# Patient Record
Sex: MALE | Race: BLACK OR AFRICAN AMERICAN | URBAN - METROPOLITAN AREA
[De-identification: names, ages, dates, MRNs, and addresses within clinical notes are randomized per-mention and may not be internally consistent; named-entity substitution may affect disease eponyms.]

---

## 2017-06-09 ENCOUNTER — CARE COORDINATION (OUTPATIENT)
Dept: FAMILY MEDICINE | Facility: CLINIC | Age: 32
End: 2017-06-09

## 2017-06-09 ENCOUNTER — OFFICE VISIT (OUTPATIENT)
Dept: FAMILY MEDICINE | Facility: CLINIC | Age: 32
End: 2017-06-09
Payer: COMMERCIAL

## 2017-06-09 VITALS
SYSTOLIC BLOOD PRESSURE: 146 MMHG | OXYGEN SATURATION: 95 % | WEIGHT: 248.7 LBS | HEIGHT: 70 IN | HEART RATE: 74 BPM | BODY MASS INDEX: 35.6 KG/M2 | DIASTOLIC BLOOD PRESSURE: 110 MMHG | TEMPERATURE: 98.2 F | RESPIRATION RATE: 14 BRPM

## 2017-06-09 DIAGNOSIS — R03.0 ELEVATED BLOOD PRESSURE READING WITHOUT DIAGNOSIS OF HYPERTENSION: ICD-10-CM

## 2017-06-09 DIAGNOSIS — Z00.01 ENCOUNTER FOR ROUTINE ADULT MEDICAL EXAM WITH ABNORMAL FINDINGS: Primary | ICD-10-CM

## 2017-06-09 DIAGNOSIS — D23.4 BENIGN NEOPLASM OF SCALP: ICD-10-CM

## 2017-06-09 PROCEDURE — 99385 PREV VISIT NEW AGE 18-39: CPT | Performed by: NURSE PRACTITIONER

## 2017-06-09 PROCEDURE — 99212 OFFICE O/P EST SF 10 MIN: CPT | Mod: 25 | Performed by: NURSE PRACTITIONER

## 2017-06-09 NOTE — MR AVS SNAPSHOT
After Visit Summary   6/9/2017    Patrice Nicole    MRN: [id number]           Patient Information     Date Of Birth          1985        Visit Information        Provider Department      6/9/2017 2:00 PM Carla Mullins NP [facility]        Today's Diagnoses     Need for prophylactic vaccination with tetanus-diphtheria (TD)    -  1    Benign neoplasm of scalp          Care Instructions      Preventive Health Recommendations  Male Ages 26 - 39    Yearly exam:             See your health care provider every year in order to  o   Review health changes.   o   Discuss preventive care.    o   Review your medicines if your doctor has prescribed any.    You should be tested each year for STDs (sexually transmitted diseases), if you re at risk.     After age 35, talk to your provider about cholesterol testing. If you are at risk for heart disease, have your cholesterol tested at least every 5 years.     If you are at risk for diabetes, you should have a diabetes test (fasting glucose).  Shots: Get a flu shot each year. Get a tetanus shot every 10 years.     Nutrition:    Eat at least 5 servings of fruits and vegetables daily.     Eat whole-grain bread, whole-wheat pasta and brown rice instead of white grains and rice.     Talk to your provider about Calcium and Vitamin D.     Lifestyle    Exercise for at least 150 minutes a week (30 minutes a day, 5 days a week). This will help you control your weight and prevent disease.     Limit alcohol to one drink per day.     No smoking.     Wear sunscreen to prevent skin cancer.     See your dentist every six months for an exam and cleaning.             Follow-ups after your visit        Additional Services     DERMATOLOGY REFERRAL       Your provider has referred you to: FMG: [facility] [phone]   [email or web address]  FMG: [facility] [phone]    "[email or web address]    Please be aware that coverage of these services is subject to the terms and limitations of your health insurance plan.  Call member services at your health plan with any benefit or coverage questions.      Please bring the following with you to your appointment:    (1) Any X-Rays, CTs or MRIs which have been performed.  Contact the facility where they were done to arrange for  prior to your scheduled appointment.    (2) List of current medications  (3) This referral request   (4) Any documents/labs given to you for this referral                  Who to contact     If you have questions or need follow up information about today's clinic visit or your schedule please contact [facility] directly at [phone].  Normal or non-critical lab and imaging results will be communicated to you by MyChart, letter or phone within 4 business days after the clinic has received the results. If you do not hear from us within 7 days, please contact the clinic through MyChart or phone. If you have a critical or abnormal lab result, we will notify you by phone as soon as possible.  Submit refill requests through Wikidata or call your pharmacy and they will forward the refill request to us. Please allow 3 business days for your refill to be completed.          Additional Information About Your Visit        Wikidata Information     Wikidata lets you send messages to your doctor, view your test results, renew your prescriptions, schedule appointments and more. To sign up, go to [email or web address] . Click on \"Log in\" on the left side of the screen, which will take you to the Welcome page. Then click on \"Sign up Now\" on the right side of the page.     You will be asked to enter the access code listed below, as well as some personal information. Please follow the directions to create your username and password.     Your access code is: [email or web address]  Expires: " "2017  2:42 PM     Your access code will  in 90 days. If you need help or a new code, please call your [facility] or [phone].        Care EveryWhere ID     This is your Care EveryWhere ID. This could be used by other organizations to access your [place] medical records  [id number]        Your Vitals Were     Pulse Temperature Respirations Height Pulse Oximetry BMI (Body Mass Index)    74 98.2  F (36.8  C) (Oral) 14 1.765 m (5' 9.5\") 95% 36.2 kg/m2       Blood Pressure from Last 3 Encounters:   17 (!) 146/110    Weight from Last 3 Encounters:   17 112.8 kg (248 lb 11.2 oz)              We Performed the Following     DERMATOLOGY REFERRAL        Primary Care Provider    None Specified       No primary provider on file.        Thank you!     Thank you for choosing [facility]  for your care. Our goal is always to provide you with excellent care. Hearing back from our patients is one way we can continue to improve our services. Please take a few minutes to complete the written survey that you may receive in the mail after your visit with us. Thank you!             Your Updated Medication List - Protect others around you: Learn how to safely use, store and throw away your medicines at www.disposemymeds.org.      Notice  As of 2017  2:42 PM    You have not been prescribed any medications.      "

## 2017-06-09 NOTE — PROGRESS NOTES
Please call patient to return to clinic in 2-4 weeks to have BP rechecked with nurse because it was high in clinic. Also encourage low sodium diet and exercise to maintain health.   JORDON Mendiola, NP-C

## 2017-06-09 NOTE — PROGRESS NOTES
Patient notified of the message below.  Appointment has been made with an ancillary nurse.  Malika Walsh RN

## 2017-06-09 NOTE — PROGRESS NOTES
SUBJECTIVE:     CC: Patrice Nicole is an 31 year old male who presents for preventative health visit.     Was in [place] before.     Healthy Habits:    Do you get at least three servings of calcium containing foods daily (dairy, green leafy vegetables, etc.)? yes    Amount of exercise or daily activities, outside of work: 3 day(s) per week    Problems taking medications regularly No    Medication side effects: No    Have you had an eye exam in the past two years? no    Do you see a dentist twice per year? no    Do you have sleep apnea, excessive snoring or daytime drowsiness?no        Concern - bumps on the back of head     Onset: 8 months ago    Description:   Has a bump on the back head and a couple more have come about    Intensity: mild, moderate    Progression of Symptoms:  worsening    Accompanying Signs & Symptoms:  Some drainage-bleeding small amount-no pain.        Previous history of similar problem: no    Precipitating factors:   Worsened by: unsure    Alleviating factors:  Improved by: nothing         Therapies Tried and outcome: nothing-was trying to keep it dry and clean.  Gets buzz cut for his hair q 2-3 weeks. Did notice some lumps forming after shaving hair.     Following a chiropractor for left back aching/tighness. 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     Dad had recent high PSA.   No family history of cancer      Today's PHQ-2 Score:   PHQ-2 ( 1999 Pfizer) 6/9/2017   Q1: Little interest or pleasure in doing things 0   Q2: Feeling down, depressed or hopeless 0   PHQ-2 Score 0       Abuse: Current or Past(Physical, Sexual or Emotional)- No  Do you feel safe in your environment - Yes    Social History   Substance Use Topics     Smoking status: Never Smoker     Smokeless tobacco: Not on file     Alcohol use Yes     The patient does not drink >3 drinks per day nor >7 drinks per week.    Last PSA: No results found for: PSA    No results for input(s): CHOL, HDL, LDL, TRIG,  TRICIA CARMONA in the last 60939 hours.    Reviewed orders with patient. Reviewed health maintenance and updated orders accordingly - Yes    Reviewed and updated as needed this visit by clinical staff  Tobacco  Allergies  Meds  Med Hx  Surg Hx  Fam Hx  Soc Hx        Reviewed and updated as needed this visit by Provider  Tobacco  Allergies  Meds  Soc Hx       History reviewed. No pertinent past medical history.   Past Surgical History:   Procedure Laterality Date     ARTHROSCOPIC RECONSTRUCTION ANTERIOR CRUCIATE LIGAMENT  2011 forgein 2014    glass removed from right lower leg       ROS:  C: NEGATIVE for fever, chills, change in weight  I: NEGATIVE for worrisome rashes, moles or lesions  E: NEGATIVE for vision changes or irritation  ENT: NEGATIVE for ear, mouth and throat problems  R: NEGATIVE for significant cough or SOB  CV: NEGATIVE for chest pain, palpitations or peripheral edema  GI: NEGATIVE for nausea, abdominal pain, heartburn, or change in bowel habits   male: negative for dysuria, hematuria, decreased urinary stream, erectile dysfunction, urethral discharge  M: NEGATIVE for significant arthralgias or myalgia  N: NEGATIVE for weakness, dizziness or paresthesias  P: NEGATIVE for changes in mood or affect    BP Readings from Last 3 Encounters:   06/09/17 (!) 146/110    Wt Readings from Last 3 Encounters:   06/09/17 112.8 kg (248 lb 11.2 oz)              There is no problem list on file for this patient.    Past Surgical History:   Procedure Laterality Date     ARTHROSCOPIC RECONSTRUCTION ANTERIOR CRUCIATE LIGAMENT  2011 forgein 2014    glass removed from right lower leg       Social History   Substance Use Topics     Smoking status: Never Smoker     Smokeless tobacco: Not on file     Alcohol use Yes     Family History   Problem Relation Age of Onset     DIABETES Maternal Grandmother          No current outpatient prescriptions on file.     Allergies   Allergen Reactions     Penicillins  "Rash     Other reaction(s): Confusion     OBJECTIVE:     BP (!) 146/110 (BP Location: Right arm, Patient Position: Chair, Cuff Size: Adult Large)  Pulse 74  Temp 98.2  F (36.8  C) (Oral)  Resp 14  Ht 1.765 m (5' 9.5\")  Wt 112.8 kg (248 lb 11.2 oz)  SpO2 95%  BMI 36.2 kg/m2  EXAM:  GENERAL: healthy, alert and no distress  EYES: Eyes grossly normal to inspection, PERRL and conjunctivae and sclerae normal  HENT: ear canals and TM's normal, nose and mouth without ulcers or lesions  NECK: no adenopathy, no asymmetry, masses, or scars and thyroid normal to palpation  RESP: lungs clear to auscultation - no rales, rhonchi or wheezes  CV: regular rate and rhythm, normal S1 S2, no S3 or S4, no murmur, click or rub, no peripheral edema  ABDOMEN: soft, nontender, no hepatosplenomegaly, no masses and bowel sounds normal  MS: no gross musculoskeletal defects noted, no edema  NEURO: Normal strength and tone, mentation intact and speech normal  PSYCH: mentation appears normal, affect normal/bright  SKIN: posterior hairline with small, non-tender keloid appearing lump with few other small lumps scattered into hairline.     ASSESSMENT/PLAN:     1. Encounter for routine adult medical exam with abnormal findings  Patient doing well.     2. Benign neoplasm of scalp  Send to derm. May need steroid injection to improve lumps but will let dermatology make that call.   - DERMATOLOGY REFERRAL    3. Elevated blood pressure reading without diagnosis of hypertension  Patient to return in 2-4 weeks for nurse BP check. Encourage low salt diet and exercise.       COUNSELING:  Reviewed preventive health counseling, as reflected in patient instructions    BP Screening:   Last 3 BP Readings:    BP Readings from Last 3 Encounters:   06/09/17 (!) 146/110     Body mass index is 36.2 kg/(m^2).    The following was recommended to the patient:  Re-screen within 4 weeks and recommend lifestyle modifications     reports that he has never smoked. He " "does not have any smokeless tobacco history on file.    Estimated body mass index is 36.2 kg/(m^2) as calculated from the following:    Height as of this encounter: 1.765 m (5' 9.5\").    Weight as of this encounter: 112.8 kg (248 lb 11.2 oz).   Weight management plan: Discussed healthy diet and exercise guidelines and patient will follow up in as needed in clinic to re-evaluate.    Counseling Resources:  ATP IV Guidelines  Pooled Cohorts Equation Calculator  FRAX Risk Assessment  ICSI Preventive Guidelines  Dietary Guidelines for Americans, 2010  USDA's MyPlate  ASA Prophylaxis  Lung CA Screening    JORDON Mendiola, NP-C  [facility]  "

## 2017-06-09 NOTE — NURSING NOTE
"Chief Complaint   Patient presents with     Physical       Initial BP (!) 160/100 (BP Location: Right arm, Patient Position: Chair, Cuff Size: Adult Large)  Pulse 74  Temp 98.2  F (36.8  C) (Oral)  Resp 14  Ht 1.765 m (5' 9.5\")  Wt 112.8 kg (248 lb 11.2 oz)  SpO2 95%  BMI 36.2 kg/m2 Estimated body mass index is 36.2 kg/(m^2) as calculated from the following:    Height as of this encounter: 1.765 m (5' 9.5\").    Weight as of this encounter: 112.8 kg (248 lb 11.2 oz).  Medication Reconciliation: complete       Lucrecia Young MA    "

## 2017-08-07 ENCOUNTER — OFFICE VISIT (OUTPATIENT)
Dept: FAMILY MEDICINE | Facility: CLINIC | Age: 32
End: 2017-08-07
Payer: COMMERCIAL

## 2017-08-07 DIAGNOSIS — L91.0 KELOID SCAR: Primary | ICD-10-CM

## 2017-08-07 PROCEDURE — 99212 OFFICE O/P EST SF 10 MIN: CPT | Mod: 25 | Performed by: FAMILY MEDICINE

## 2017-08-07 PROCEDURE — 11900 INJECT SKIN LESIONS </W 7: CPT | Performed by: FAMILY MEDICINE

## 2017-08-07 NOTE — PROGRESS NOTES
"[facility] - PRIMARY CARE SKIN    CC : Lesion(s)  SUBJECTIVE:                                                    Patrice Nicole is a 31 year old male who presents to clinic today because of a lesion on the back of the neck.    Bothersome lesions noticed by the patient or other skin concerns :  Issue One : Lesion on back of head. Bleeding occurs most frequently after washing his hair and the area \"softening\". He denies a history of rashes or acne in the affected area.  Onset : unknown.  Enlarging : YES.  Bleeding : YES  Itchy or irritating : NO.  Pain or tenderness : YES.  Changing color : NO.    Personal history of skin cancer : NO.  Family history of skin cancer : NO.    Sun Exposure History  Previous history of significant sun exposure:  Blistering sunburns : NO  Tanning beds : NO.  Sunscreen Use : NO.  Sun-protective clothing use : No  Wide-brimmed hats : Yes  Sunglasses : Yes  Seeks shade : No    Occupation : pest control (both indoor & outdoor).    Refer to electronic medical record (EMR) for past medical history and medications.    INTEGUMENTARY/SKIN: POSITIVE for non-healing lesion  ROS : 14 point review of systems was negative except the symptoms listed above in the HPI.    This document serves as a record of the services and decisions personally performed and made by Alivia Cuellar MD. It was created on her behalf by Phong Bonilla, a trained medical scribe.  The creation of this document is based on the scribe's personal observations and the provider's statements to the medical scribe.  Phong Bonilla, August 7, 2017 12:09 PM      OBJECTIVE:                                                    GENERAL: healthy, alert and no distress  SKIN: Baxter Skin Type - VI.  Neck were examined. The dermatoscope was used to help evaluate pigmented lesions.  Skin Pertinent Findings:  Mid-occipital scalp : 15 mm x 7 mm in size, raised, firm lesion consistent with keloid.  Name : Triamcinolone acetonide (Kenalog) " injection.  Indication : keloids.  Completed by : Alivia Cuellar MD  Note : Prior to the procedure, we discussed possible hypo- and hyperpigmentation or depression of the skin post-procedure. Explained that more then one injection, up to six injections, spaced 4-6 weeks apart may be necessary.    Skin was cleansed with alcohol. 0.3 mL triamcinolone acetonide 10 mg/mL drawn up and injected intralesionally into the lesion.  Total injected :   Smaller mid-occipital scalp keloid 0.02 mL = 0.2 mg  Larger mid-occipital scalp keloid : 0.18 mL = 1.8 mg  Lot # : [id number]. Expiration Date : 8/2018.    Blanching was present during the injection. Minimal bleeding occurred. Patient left in satisfactory condition.  Total number treated : 2.  Treatment number : 1.  No erythema, fluctuance  Diagnostic Test Results:  none           ASSESSMENT:                                                      Encounter Diagnosis   Name Primary?     Keloid scar Yes         PLAN:                                                    Patient Instructions   FUTURE APPOINTMENTS  Follow up in 4-6 weeks for re-treatment.        PROCEDURES:                                                    None.    TT : 20 minutes.  CT : 15 minutes.      The information in this document, created by the medical scribe for me, accurately reflects the services I personally performed and the decisions made by me. I have reviewed and approved this document for accuracy prior to leaving the patient care area.  Alivia Cuellar MD August 7, 2017 12:09 PM  [facility] - PRIMARY CARE SKIN

## 2017-08-07 NOTE — MR AVS SNAPSHOT
"              After Visit Summary   2017    Patrice Nicole    MRN: [id number]           Patient Information     Date Of Birth          1985        Visit Information        Provider Department      2017 12:00 PM Cristal Cuellar MD [facility] Primary Care Skin        Today's Diagnoses     Keloid scar    -  1      Care Instructions    FUTURE APPOINTMENTS  Follow up in 4-6 weeks for re-treatment.    SHAVING INSTRUCTIONS    When you get haircuts, cut in the direction of hair growth.          Follow-ups after your visit        Who to contact     If you have questions or need follow up information about today's clinic visit or your schedule please contact [facility] PR[facility] CARE SKIN directly at [phone].  Normal or non-critical lab and imaging results will be communicated to you by MyChart, letter or phone within 4 business days after the clinic has received the results. If you do not hear from us within 7 days, please contact the clinic through MyChart or phone. If you have a critical or abnormal lab result, we will notify you by phone as soon as possible.  Submit refill requests through AVST or call your pharmacy and they will forward the refill request to us. Please allow 3 business days for your refill to be completed.          Additional Information About Your Visit        MyChart Information     AVST lets you send messages to your doctor, view your test results, renew your prescriptions, schedule appointments and more. To sign up, go to [email or web address] . Click on \"Log in\" on the left side of the screen, which will take you to the Welcome page. Then click on \"Sign up Now\" on the right side of the page.     You will be asked to enter the access code listed below, as well as some personal information. Please follow the directions to create your username and password.     Your access code is: [email or web address]  Expires: 2017  2:42 PM     Your access code will  " in 90 days. If you need help or a new code, please call your [place] clinic or [phone].        Care EveryWhere ID     This is your Care EveryWhere ID. This could be used by other organizations to access your [facility] medical records  [id number]         Blood Pressure from Last 3 Encounters:   06/09/17 (!) 146/110    Weight from Last 3 Encounters:   06/09/17 248 lb 11.2 oz (112.8 kg)              We Performed the Following     INJECTION INTO SKIN LESIONS <=7     TRIAMCINOLONE ACET INJ NOS        Primary Care Provider    None Specified       No primary provider on file.        Equal Access to Services     [facility]: Hadii gwendolyn Barajas, jolene dave, rachel ritchie, jose loza . So [facility] [phone].    ATENCIÓN: Si habla español, tiene a teresa disposición servicios gratuitos de asistencia lingüística. Llame al [phone].    We comply with applicable federal civil rights laws and [place] laws. We do not discriminate on the basis of race, color, national origin, age, disability sex, sexual orientation or gender identity.            Thank you!     Thank you for choosing [facility] - PRIMARY CARE SKIN  for your care. Our goal is always to provide you with excellent care. Hearing back from our patients is one way we can continue to improve our services. Please take a few minutes to complete the written survey that you may receive in the mail after your visit with us. Thank you!             Your Updated Medication List - Protect others around you: Learn how to safely use, store and throw away your medicines at www.disposemymeds.org.      Notice  As of 8/7/2017 12:17 PM    You have not been prescribed any medications.

## 2017-08-07 NOTE — PATIENT INSTRUCTIONS
FUTURE APPOINTMENTS  Follow up in 4-6 weeks for re-treatment.    SHAVING INSTRUCTIONS    When you get haircuts, cut in the direction of hair growth.

## 2017-09-08 ENCOUNTER — OFFICE VISIT (OUTPATIENT)
Dept: FAMILY MEDICINE | Facility: CLINIC | Age: 32
End: 2017-09-08
Payer: COMMERCIAL

## 2017-09-08 DIAGNOSIS — L91.0 KELOID SCAR: Primary | ICD-10-CM

## 2017-09-08 PROCEDURE — 11900 INJECT SKIN LESIONS </W 7: CPT | Performed by: FAMILY MEDICINE

## 2017-09-08 NOTE — MR AVS SNAPSHOT
"              After Visit Summary   2017    Patrice Nicole    MRN: [id number]           Patient Information     Date Of Birth          1985        Visit Information        Provider Department      2017 2:00 PM Cristal Cuellar MD [facility] Primary Care Skin        Today's Diagnoses     Keloid scar    -  1      Care Instructions    FUTURE APPOINTMENTS  Follow up in 4-6 weeks.          Follow-ups after your visit        Who to contact     If you have questions or need follow up information about today's clinic visit or your schedule please contact [facility] PRIMARY CARE SKIN directly at [phone].  Normal or non-critical lab and imaging results will be communicated to you by MyChart, letter or phone within 4 business days after the clinic has received the results. If you do not hear from us within 7 days, please contact the clinic through Jaxtrhart or phone. If you have a critical or abnormal lab result, we will notify you by phone as soon as possible.  Submit refill requests through Newsy or call your pharmacy and they will forward the refill request to us. Please allow 3 business days for your refill to be completed.          Additional Information About Your Visit        MyChart Information     Newsy lets you send messages to your doctor, view your test results, renew your prescriptions, schedule appointments and more. To sign up, go to [email or web address] . Click on \"Log in\" on the left side of the screen, which will take you to the Welcome page. Then click on \"Sign up Now\" on the right side of the page.     You will be asked to enter the access code listed below, as well as some personal information. Please follow the directions to create your username and password.     Your access code is: [email or web address]  Expires: 2017  2:24 PM     Your access code will  in 90 days. If you need help or a new code, please call your [facility] or [phone].      "   Care EveryWhere ID     This is your Care EveryWhere ID. This could be used by other organizations to access your [place] medical records  [id number]         Blood Pressure from Last 3 Encounters:   06/09/17 (!) 146/110    Weight from Last 3 Encounters:   06/09/17 248 lb 11.2 oz (112.8 kg)              Today, you had the following     No orders found for display       Primary Care Provider    None Specified       No primary provider on file.        Equal Access to Services     [facility] : Hadii gwendolyn rojas Soespinoza, waedwardda lubalaadaha, qaybta kaalmada adelillyyada, jose loza . So [facility] [phone].    ATENCIÓN: Si habla español, tiene a teresa disposición servicios gratuitos de asistencia lingüística. Llame al [phone].    We comply with applicable federal civil rights laws and [place] laws. We do not discriminate on the basis of race, color, national origin, age, disability sex, sexual orientation or gender identity.            Thank you!     Thank you for choosing [facility] - [facility]IMARY CARE [facility]  for your care. Our goal is always to provide you with excellent care. Hearing back from our patients is one way we can continue to improve our services. Please take a few minutes to complete the written survey that you may receive in the mail after your visit with us. Thank you!             Your Updated Medication List - Protect others around you: Learn how to safely use, store and throw away your medicines at www.disposemymeds.org.      Notice  As of 9/8/2017  2:24 PM    You have not been prescribed any medications.

## 2017-09-08 NOTE — PROGRESS NOTES
[facility] - PRIMARY CARE SKIN    CC : Lesion(s)  SUBJECTIVE:                                                    Patrice Nicole is a 31 year old male who presents to clinic today for follow-up of a keloid on the back of the scalp.    Last treatment : 8/7/17  Treatment : intralesional triamcinolone 2 mg injected  Treatment number : 1  Response : he has not noticed much change.    Personal history of skin cancer : NO.  Family history of skin cancer : NO.    Sun Exposure History  Previous history of significant sun exposure:  Blistering sunburns : NO  Tanning beds : NO.  Sunscreen Use : NO.  Sun-protective clothing use : No  Wide-brimmed hats : Yes  Sunglasses : Yes  Seeks shade : No    Occupation : pest control (both indoor & outdoor).    Refer to electronic medical record (EMR) for past medical history and medications.    INTEGUMENTARY/SKIN: POSITIVE for non-healing lesion  ROS : 14 point review of systems was negative except the symptoms listed above in the HPI.    This document serves as a record of the services and decisions personally performed and made by Alivia Cuellar MD. It was created on her behalf by Phong Bonilla, a trained medical scribe.  The creation of this document is based on the scribe's personal observations and the provider's statements to the medical scribe.  Phong Bonilla, September 8, 2017 2:22 PM      OBJECTIVE:                                                    GENERAL: healthy, alert and no distress  SKIN: Baxter Skin Type - VI.  Neck were examined. The dermatoscope was used to help evaluate pigmented lesions.  Skin Pertinent Findings:  Mid-occipital scalp : 15 mm x 5 mm in size, raised, firm lesion consistent with keloid.  Name : Triamcinolone acetonide (Kenalog) injection.  Indication : keloids.  Completed by : Alivia Cuellar MD  Note : Prior to the procedure, we discussed possible hypo- and hyperpigmentation or depression of the skin post-procedure. Explained that more then one injection,  up to six injections, spaced 4-6 weeks apart may be necessary.    Cryotherapy burst of 5 seconds prior to injection as documented below.    Skin was cleansed with alcohol. 0.3 mL triamcinolone acetonide 10 mg/mL drawn up and injected intralesionally into the lesion.  Total injected :   Into single lesion : 0.0.2 mL = 2 mg  Lot # : [id number]. Expiration Date : 5/2018.  Distributed 0.05 ml into two other 3 mm papules  Blanching was present during the injection. Minimal bleeding occurred. Patient left in satisfactory condition.  Total number treated : 2.  Treatment number : 1.    Diagnostic Test Results:  none           ASSESSMENT:                                                      Encounter Diagnosis   Name Primary?     Keloid scar Yes         PLAN:                                                    Patient Instructions   FUTURE APPOINTMENTS  Follow up in 4-6 weeks.        PROCEDURES:                                                    None.    TT : 20 minutes.  CT : 15 minutes.      The information in this document, created by the medical scribe for me, accurately reflects the services I personally performed and the decisions made by me. I have reviewed and approved this document for accuracy prior to leaving the patient care area.  Alivia Cuellar MD September 8, 2017 2:22 PM  [facility] - PRIMARY CARE SKIN

## 2017-10-20 ENCOUNTER — OFFICE VISIT (OUTPATIENT)
Dept: FAMILY MEDICINE | Facility: CLINIC | Age: 32
End: 2017-10-20
Payer: COMMERCIAL

## 2017-10-20 DIAGNOSIS — L91.0 KELOID SCAR: Primary | ICD-10-CM

## 2017-10-20 PROCEDURE — 11900 INJECT SKIN LESIONS </W 7: CPT | Performed by: FAMILY MEDICINE

## 2017-10-20 NOTE — LETTER
10/20/2017         RE: Patrice Nicole  [address]  [place] [address]        Dear Colleague,    Thank you for referring your patient, Patrice Nicole, to the [facility] - PRIMARY CARE SKIN. Please see a copy of my visit note below.    [facility] PRIMARY CARE SKIN    CC : Lesion(s)  SUBJECTIVE:                                                    Patrice Nicole is a 31 year old male who presents to clinic today for follow-up of a keloid on the back of the scalp.    Last treatment : 9/8/17  Treatment : intralesional triamcinolone 2 mg injected  Treatment number : 2  Response : The size of the keloid scars have remained the same.    Personal history of skin cancer : NO.  Family history of skin cancer : NO.    Sun Exposure History  Previous history of significant sun exposure:  Blistering sunburns : NO  Tanning beds : NO.  Sunscreen Use : NO.  Sun-protective clothing use : No  Wide-brimmed hats : Yes  Sunglasses : Yes  Seeks shade : No    Occupation : pest control (both indoor & outdoor).    Refer to electronic medical record (EMR) for past medical history and medications.    INTEGUMENTARY/SKIN: POSITIVE for non-healing lesion  ROS : 14 point review of systems was negative except the symptoms listed above in the HPI.    This document serves as a record of the services and decisions personally performed and made by Alivia Cuellar MD. It was created on her behalf by Phong Bonilla, a trained medical scribe.  The creation of this document is based on the scribe's personal observations and the provider's statements to the medical scribe.  Phong Bonilla, October 20, 2017 12:03 PM      OBJECTIVE:                                                    GENERAL: healthy, alert and no distress  SKIN: Baxter Skin Type - VI.  Neck were examined. The dermatoscope was used to help evaluate pigmented lesions.  Skin Pertinent Findings:  Mid-occipital scalp : 15 mm x 5 mm in size, raised, softer in texture lesion consistent  with keloid.  Name : Triamcinolone acetonide (Kenalog) injection.  Indication : keloids.  Completed by : Alivia Cuellar MD  Note : Prior to the procedure, we discussed possible hypo- and hyperpigmentation or depression of the skin post-procedure. Explained that more then one injection, up to six injections, spaced 4-6 weeks apart may be necessary.    Cryotherapy burst of 5 seconds prior to injection as documented below.    Skin was cleansed with alcohol. 0.1 mL triamcinolone acetonide 40 mg/mL drawn up and injected intralesionally into the lesion.  Dosage 4 mg/0.1 mL  Total injected :   Into single lesion : 0.1 mL = 4 mg  Lot # : [id number] Expiration Date : Apr 2018  Distributed 0.025 ml into small keloid. Remaining into large keloid.  Blanching was present during the injection. Minimal bleeding occurred. Patient left in satisfactory condition.  Total number treated : 2.  Treatment number : 3.      ASSESSMENT:                                                      Encounter Diagnosis   Name Primary?     Keloid scar Yes         PLAN:                                                    Patient Instructions   FUTURE APPOINTMENTS  Follow up in 4-6 weeks.        PROCEDURES:                                                    None.    TT : 20 minutes.  CT : 15 minutes.      The information in this document, created by the medical scribe for me, accurately reflects the services I personally performed and the decisions made by me. I have reviewed and approved this document for accuracy prior to leaving the patient care area.  Alivia Cuellar MD October 20, 2017 12:03 PM  [facility] - PRIMARY CARE SKIN    Again, thank you for allowing me to participate in the care of your patient.        Sincerely,        Cristal Cuellar MD

## 2017-10-20 NOTE — PROGRESS NOTES
[facility] - PRIMARY CARE SKIN    CC : Lesion(s)  SUBJECTIVE:                                                    Patrice Nicole is a 31 year old male who presents to clinic today for follow-up of a keloid on the back of the scalp.    Last treatment : 9/8/17  Treatment : intralesional triamcinolone 2 mg injected  Treatment number : 2  Response : The size of the keloid scars have remained the same.    Personal history of skin cancer : NO.  Family history of skin cancer : NO.    Sun Exposure History  Previous history of significant sun exposure:  Blistering sunburns : NO  Tanning beds : NO.  Sunscreen Use : NO.  Sun-protective clothing use : No  Wide-brimmed hats : Yes  Sunglasses : Yes  Seeks shade : No    Occupation : pest control (both indoor & outdoor).    Refer to electronic medical record (EMR) for past medical history and medications.    INTEGUMENTARY/SKIN: POSITIVE for non-healing lesion  ROS : 14 point review of systems was negative except the symptoms listed above in the HPI.    This document serves as a record of the services and decisions personally performed and made by Alivia Cuellar MD. It was created on her behalf by Phong Bonilla, a trained medical scribe.  The creation of this document is based on the scribe's personal observations and the provider's statements to the medical scribe.  Phong Bonilla, October 20, 2017 12:03 PM      OBJECTIVE:                                                    GENERAL: healthy, alert and no distress  SKIN: Baxter Skin Type - VI.  Neck were examined. The dermatoscope was used to help evaluate pigmented lesions.  Skin Pertinent Findings:  Mid-occipital scalp : 15 mm x 5 mm in size, raised, softer in texture lesion consistent with keloid.  Name : Triamcinolone acetonide (Kenalog) injection.  Indication : keloids.  Completed by : Alivia Cuellar MD  Note : Prior to the procedure, we discussed possible hypo- and hyperpigmentation or depression of the skin post-procedure.  Explained that more then one injection, up to six injections, spaced 4-6 weeks apart may be necessary.    Cryotherapy burst of 5 seconds prior to injection as documented below.    Skin was cleansed with alcohol. 0.1 mL triamcinolone acetonide 40 mg/mL drawn up and injected intralesionally into the lesion.  Dosage 4 mg/0.1 mL  Total injected :   Into single lesion : 0.1 mL = 4 mg  Lot # : [id number] Expiration Date : Apr 2018  Distributed 0.025 ml into small keloid. Remaining into large keloid.  Blanching was present during the injection. Minimal bleeding occurred. Patient left in satisfactory condition.  Total number treated : 2.  Treatment number : 3.      ASSESSMENT:                                                      Encounter Diagnosis   Name Primary?     Keloid scar Yes         PLAN:                                                    Patient Instructions   FUTURE APPOINTMENTS  Follow up in 4-6 weeks.        PROCEDURES:                                                    None.    TT : 20 minutes.  CT : 15 minutes.      The information in this document, created by the medical scribe for me, accurately reflects the services I personally performed and the decisions made by me. I have reviewed and approved this document for accuracy prior to leaving the patient care area.  Alivia Cuellar MD October 20, 2017 12:03 PM  [facility] - PRIMARY CARE SKIN

## 2017-10-20 NOTE — MR AVS SNAPSHOT
After Visit Summary   10/20/2017    Patrice Nicole    MRN: [id number]           Patient Information     Date Of Birth          1985        Visit Information        Provider Department      10/20/2017 12:00 PM Cristal Cuellar MD [facility] Primary Care Skin        Today's Diagnoses     Keloid scar    -  1      Care Instructions    FUTURE APPOINTMENTS  Follow up in 4-6 weeks.          Follow-ups after your visit        Your next 10 appointments already scheduled     Dec 01, 2017 12:00 PM CST   Office Visit with Cristal Cuellar MD   [facility] Primary Care Skin ([facility] )    [address] [place] [address]  [address]  [facility] [address]   [phone]           Bring a current list of meds and any records pertaining to this visit. For Physicals, please bring immunization records and any forms needing to be filled out. Please arrive 10 minutes early to complete paperwork.              Who to contact     If you have questions or need follow up information about today's clinic visit or your schedule please contact [facility] PRIMARY CARE SKIN directly at [phone].  Normal or non-critical lab and imaging results will be communicated to you by Radialogicahart, letter or phone within 4 business days after the clinic has received the results. If you do not hear from us within 7 days, please contact the clinic through Radialogicahart or phone. If you have a critical or abnormal lab result, we will notify you by phone as soon as possible.  Submit refill requests through Hello! Messenger or call your pharmacy and they will forward the refill request to us. Please allow 3 business days for your refill to be completed.          Additional Information About Your Visit        Hello! Messenger Information     Hello! Messenger lets you send messages to your doctor, view your test results, renew your prescriptions, schedule appointments and more. To sign up, go to  [email or web address] . Click on \"Log in\" on the left side of the screen, which will take you to the Welcome page. Then click on \"Sign up Now\" on the right side of the page.     You will be asked to enter the access code listed below, as well as some personal information. Please follow the directions to create your username and password.     Your access code is: [email or web address]  Expires: 2017  2:24 PM     Your access code will  in 90 days. If you need help or a new code, please call your [facility] or [phone].        Care EveryWhere ID     This is your Care EveryWhere ID. This could be used by other organizations to access your [place] medical records  [id number]         Blood Pressure from Last 3 Encounters:   17 (!) 146/110    Weight from Last 3 Encounters:   17 248 lb 11.2 oz (112.8 kg)              Today, you had the following     No orders found for display       Primary Care Provider Office Phone # Fax #    [facility] [phone]       [address]  [facility] [address]        Equal Access to Services     CALDERON HOWARD : Hadii gwendolyn ku hadasho Soomaali, waaxda luqadaha, qaybta kaalmada adeegyada, jose landry. So [facility] [phone].    ATENCIÓN: Si habla español, tiene a teresa disposición servicios gratuitos de asistencia lingüística. Llame al [phone].    We comply with applicable federal civil rights laws and [place] laws. We do not discriminate on the basis of race, color, national origin, age, disability, sex, sexual orientation, or gender identity.            Thank you!     Thank you for choosing [facility] PRIMARY CARE [facility]  for your care. Our goal is always to provide you with excellent care. Hearing back from our patients is one way we can continue to improve our services. Please take a few minutes to complete the written survey that you may receive in the mail after your visit with us. Thank " you!             Your Updated Medication List - Protect others around you: Learn how to safely use, store and throw away your medicines at www.disposemymeds.org.      Notice  As of 10/20/2017 12:15 PM    You have not been prescribed any medications.

## 2017-12-01 ENCOUNTER — OFFICE VISIT (OUTPATIENT)
Dept: FAMILY MEDICINE | Facility: CLINIC | Age: 32
End: 2017-12-01
Payer: COMMERCIAL

## 2017-12-01 DIAGNOSIS — L91.0 KELOID SCAR: Primary | ICD-10-CM

## 2017-12-01 PROCEDURE — 11900 INJECT SKIN LESIONS </W 7: CPT | Performed by: FAMILY MEDICINE

## 2017-12-01 RX ORDER — TRIAMCINOLONE ACETONIDE 40 MG/ML
40 INJECTION, SUSPENSION INTRA-ARTICULAR; INTRAMUSCULAR ONCE
Qty: 0.2 ML | Refills: 0 | OUTPATIENT
Start: 2017-12-01 | End: 2017-12-01

## 2017-12-01 NOTE — PROGRESS NOTES
[facility] - PRIMARY CARE SKIN    CC : Lesion(s)  SUBJECTIVE:                                                    Patrice Nicole is a 31 year old male who presents to clinic today for follow-up of a keloid on the back of the scalp.    Last treatment : 10/20/17  Treatment : intralesional triamcinolone 4 mg injected  Treatment number : 3  Response : The scar continues to have a hard texture. The keloid slightly diminished in size 1 week after last treatment. However, the size of scar has again enlarged; pain is noted at site of scar.    Personal history of skin cancer : NO.  Family history of skin cancer : NO.    Sun Exposure History  Previous history of significant sun exposure:  Blistering sunburns : NO  Tanning beds : NO.  Sunscreen Use : NO.  Sun-protective clothing use : No  Wide-brimmed hats : Yes  Sunglasses : Yes  Seeks shade : No    Occupation : pest control (both indoor & outdoor).    Refer to electronic medical record (EMR) for past medical history and medications.    INTEGUMENTARY/SKIN: POSITIVE for non-healing lesion  ROS : 14 point review of systems was negative except the symptoms listed above in the HPI.    This document serves as a record of the services and decisions personally performed and made by Alivia Cuellar MD. It was created on her behalf by Phong Bonilla, a trained medical scribe.  The creation of this document is based on the scribe's personal observations and the provider's statements to the medical scribe.  Phong Bonilla, December 1, 2017 11:57 AM      OBJECTIVE:                                                    GENERAL: healthy, alert and no distress  SKIN: Baxter Skin Type - VI.  Neck were examined. The dermatoscope was used to help evaluate pigmented lesions.  Skin Pertinent Findings:  Mid-occipital scalp : 15 mm x 5 mm in size, raised, softer in texture lesion consistent with keloid.  Name : Triamcinolone acetonide (Kenalog) injection.  Indication : keloids.  Completed by : Alivia  MD Alisa  Note : Prior to the procedure, we discussed possible hypo- and hyperpigmentation or depression of the skin post-procedure. Explained that more then one injection, up to six injections, spaced 4-6 weeks apart may be necessary.    Cryotherapy burst of 5 seconds prior to injection as documented below.    Skin was cleansed with alcohol. 0.2 mL triamcinolone acetonide 40 mg/mL drawn up and injected intralesionally into the lesion  Dosage 4 mg/0.1 mL  Total injected :   Into single lesion : 0.2 mL = 8 mg  Lot # : [id number] Expiration Date : Feb 2019  Distributed 0.025 ml into small keloid. Remaining into large keloid.  Blanching was present during the injection. Minimal bleeding occurred. Patient left in satisfactory condition.  Total number treated : 2.  Treatment number : 4.      ASSESSMENT:                                                      Encounter Diagnosis   Name Primary?     Keloid scar Yes         PLAN:                                                    Patient Instructions   FUTURE APPOINTMENTS  Follow up in 4-6 week(s).  Discussed other options such as surgery, laser       PROCEDURES:                                                    None.    TT : 20 minutes.  CT : 15 minutes.      The information in this document, created by the medical scribe for me, accurately reflects the services I personally performed and the decisions made by me. I have reviewed and approved this document for accuracy prior to leaving the patient care area.  Alivia Cuellar MD December 1, 2017 11:57 AM  [facility] - PRIMARY CARE SKIN

## 2017-12-01 NOTE — LETTER
12/1/2017         RE: Patrice Nicole  [address]  [place] [address]        Dear Colleague,    Thank you for referring your patient, Patrice Nicole, to the [facility] PRIMARY [facility] SKIN. Please see a copy of my visit note below.    [facility] SKIN    CC : Lesion(s)  SUBJECTIVE:                                                    Patrice Nicole is a 31 year old male who presents to clinic today for follow-up of a keloid on the back of the scalp.    Last treatment : 10/20/17  Treatment : intralesional triamcinolone 4 mg injected  Treatment number : 3  Response : The scar continues to have a hard texture. The keloid slightly diminished in size 1 week after last treatment. However, the size of scar has again enlarged; pain is noted at site of scar.    Personal history of skin cancer : NO.  Family history of skin cancer : NO.    Sun Exposure History  Previous history of significant sun exposure:  Blistering sunburns : NO  Tanning beds : NO.  Sunscreen Use : NO.  Sun-protective clothing use : No  Wide-brimmed hats : Yes  Sunglasses : Yes  Seeks shade : No    Occupation : pest control (both indoor & outdoor).    Refer to electronic medical record (EMR) for past medical history and medications.    INTEGUMENTARY/SKIN: POSITIVE for non-healing lesion  ROS : 14 point review of systems was negative except the symptoms listed above in the HPI.    This document serves as a record of the services and decisions personally performed and made by Alivia Cuellar MD. It was created on her behalf by Phong Bonilla, a trained medical scribe.  The creation of this document is based on the scribe's personal observations and the provider's statements to the medical scribe.  Phong Bonilla, December 1, 2017 11:57 AM      OBJECTIVE:                                                    GENERAL: healthy, alert and no distress  SKIN: Baxter Skin Type - VI.  Neck were examined. The dermatoscope was used to help evaluate  pigmented lesions.  Skin Pertinent Findings:  Mid-occipital scalp : 15 mm x 5 mm in size, raised, softer in texture lesion consistent with keloid.  Name : Triamcinolone acetonide (Kenalog) injection.  Indication : keloids.  Completed by : Alivia Cuellar MD  Note : Prior to the procedure, we discussed possible hypo- and hyperpigmentation or depression of the skin post-procedure. Explained that more then one injection, up to six injections, spaced 4-6 weeks apart may be necessary.    Cryotherapy burst of 5 seconds prior to injection as documented below.    Skin was cleansed with alcohol. 0.2 mL triamcinolone acetonide 40 mg/mL drawn up and injected intralesionally into the lesion  Dosage 4 mg/0.1 mL  Total injected :   Into single lesion : 0.2 mL = 8 mg  Lot # : [id number] Expiration Date : Feb 2019  Distributed 0.025 ml into small keloid. Remaining into large keloid.  Blanching was present during the injection. Minimal bleeding occurred. Patient left in satisfactory condition.  Total number treated : 2.  Treatment number : 4.      ASSESSMENT:                                                      Encounter Diagnosis   Name Primary?     Keloid scar Yes         PLAN:                                                    Patient Instructions   FUTURE APPOINTMENTS  Follow up in 4-6 week(s).  Discussed other options such as surgery, laser       PROCEDURES:                                                    None.    TT : 20 minutes.  CT : 15 minutes.      The information in this document, created by the medical scribe for me, accurately reflects the services I personally performed and the decisions made by me. I have reviewed and approved this document for accuracy prior to leaving the patient care area.  Alivia Cuellar MD December 1, 2017 11:57 AM  [facility] - PRIMARY CARE SKIN    Again, thank you for allowing me to participate in the care of your patient.        Sincerely,        Cristal Cuellar MD

## 2018-01-05 ENCOUNTER — OFFICE VISIT (OUTPATIENT)
Dept: FAMILY MEDICINE | Facility: CLINIC | Age: 33
End: 2018-01-05
Payer: COMMERCIAL

## 2018-01-05 DIAGNOSIS — L91.0 KELOID SCAR: Primary | ICD-10-CM

## 2018-01-05 PROCEDURE — 11900 INJECT SKIN LESIONS </W 7: CPT | Performed by: FAMILY MEDICINE

## 2018-01-05 RX ORDER — TRIAMCINOLONE ACETONIDE 40 MG/ML
8 INJECTION, SUSPENSION INTRA-ARTICULAR; INTRAMUSCULAR ONCE
Qty: 0.2 ML | Refills: 0 | OUTPATIENT
Start: 2018-01-05 | End: 2018-01-05

## 2018-01-05 NOTE — PROGRESS NOTES
"[facility] - PRIMARY CARE SKIN    CC : Lesion(s)  SUBJECTIVE:                                                    Patrice Nicole is a 32 year old male who presents to clinic today for follow-up of a keloid on the back of the scalp.    Last treatment : 12/1/17  Treatment : intralesional triamcinolone 8 mg injected into two keloids  Treatment number : 4  Response : Keloid may be flatter and \"less solid\".    Personal history of skin cancer : NO.  Family history of skin cancer : NO.    Sun Exposure History  Previous history of significant sun exposure:  Blistering sunburns : NO  Tanning beds : NO.  Sunscreen Use : NO.  Sun-protective clothing use : No  Wide-brimmed hats : Yes  Sunglasses : Yes  Seeks shade : No    Occupation : pest control (both indoor & outdoor).    Refer to electronic medical record (EMR) for past medical history and medications.    INTEGUMENTARY/SKIN: POSITIVE for non-healing lesion  ROS : 14 point review of systems was negative except the symptoms listed above in the HPI.    This document serves as a record of the services and decisions personally performed and made by Alivia Cuellar MD. It was created on her behalf by Phong Bonilla, a trained medical scribe.  The creation of this document is based on the scribe's personal observations and the provider's statements to the medical scribe.  Phong Bonilla, January 5, 2018 12:06 PM      OBJECTIVE:                                                    GENERAL: healthy, alert and no distress  SKIN: Baxter Skin Type - VI.  Neck were examined. The dermatoscope was used to help evaluate pigmented lesions.  Skin Pertinent Findings:  Mid-occipital scalp : 15 x 5 mm in size, raised, softer in texture lesion consistent with keloid.  Name : Triamcinolone acetonide (Kenalog) injection.  Indication : keloids.  Completed by : Alivia Cuellar MD  Note : Prior to the procedure, we discussed possible hypo- and hyperpigmentation or depression of the skin post-procedure. " Explained that more then one injection, up to six injections, spaced 4-6 weeks apart may be necessary.    Cryotherapy burst of 5 seconds prior to injection as documented below.    Skin was cleansed with alcohol. 0.5 mL triamcinolone acetonide 40 mg/mL drawn up and injected intralesionally into the lesion.  Dosage 20 mg/0.5 mL  Total injected :   Into single lesion : 0.2 mL = 8 mg  Lot # : [id number] Expiration Date : Feb 2019  Distributed 0.025 ml into small keloid. Remaining into large keloid.  Blanching was present during the injection. Minimal bleeding occurred. Patient left in satisfactory condition.  Treatment number : 5.    MDM : Previously discussed other options such as surgery, laser       ASSESSMENT:                                                      Encounter Diagnosis   Name Primary?     Keloid scar Yes         PLAN:                                                    Patient Instructions   FUTURE APPOINTMENTS  Follow up in 4-6 week(s).         PROCEDURES:                                                    None.    TT : 20 minutes.  CT : 15 minutes.      The information in this document, created by the medical scribe for me, accurately reflects the services I personally performed and the decisions made by me. I have reviewed and approved this document for accuracy prior to leaving the patient care area.  Alivia Cuellar MD January 5, 2018 12:06 PM  [facility] - PRIMARY CARE SKIN

## 2018-01-05 NOTE — MR AVS SNAPSHOT
"              After Visit Summary   2018    Patrice Nicole    MRN: [id number]           Patient Information     Date Of Birth          1985        Visit Information        Provider Department      2018 12:00 PM Cristal Cuellar MD [facility] Primary Care Skin        Today's Diagnoses     Keloid scar    -  1      Care Instructions    FUTURE APPOINTMENTS  Follow up in 4-6 week(s).          Follow-ups after your visit        Who to contact     If you have questions or need follow up information about today's clinic visit or your schedule please contact [facility] PRIMARY CARE SKIN directly at [phone].  Normal or non-critical lab and imaging results will be communicated to you by Stackifyhart, letter or phone within 4 business days after the clinic has received the results. If you do not hear from us within 7 days, please contact the clinic through Stackifyhart or phone. If you have a critical or abnormal lab result, we will notify you by phone as soon as possible.  Submit refill requests through Ludia or call your pharmacy and they will forward the refill request to us. Please allow 3 business days for your refill to be completed.          Additional Information About Your Visit        MyChart Information     Ludia lets you send messages to your doctor, view your test results, renew your prescriptions, schedule appointments and more. To sign up, go to [email or web address] . Click on \"Log in\" on the left side of the screen, which will take you to the Welcome page. Then click on \"Sign up Now\" on the right side of the page.     You will be asked to enter the access code listed below, as well as some personal information. Please follow the directions to create your username and password.     Your access code is: [email or web address]  Expires: 2018 12:10 PM     Your access code will  in 90 days. If you need help or a new code, please call your [facility] or [phone].      "   Care EveryWhere ID     This is your Care EveryWhere ID. This could be used by other organizations to access your [facility] medical records  [id number]         Blood Pressure from Last 3 Encounters:   06/09/17 (!) 146/110    Weight from Last 3 Encounters:   06/09/17 248 lb 11.2 oz (112.8 kg)              Today, you had the following     No orders found for display       Primary Care Provider Office Phone # Fax #    [facility] [phone]       [address]  [facility] [address]        Equal Access to Services     [facility]: Hadii aad ku hadasho Soomaali, waaxda luqadaha, qaybta kaalmada adeegyada, waxay luisin hayjohn loza . So [facility] [phone].    ATENCIÓN: Si habla español, tiene a teresa disposición servicios gratuitos de asistencia lingüística. Llame al [phone].    We comply with applicable federal civil rights laws and [place] laws. We do not discriminate on the basis of race, color, national origin, age, disability, sex, sexual orientation, or gender identity.            Thank you!     Thank you for choosing [facility] - PRIMARY CARE [facility]  for your care. Our goal is always to provide you with excellent care. Hearing back from our patients is one way we can continue to improve our services. Please take a few minutes to complete the written survey that you may receive in the mail after your visit with us. Thank you!             Your Updated Medication List - Protect others around you: Learn how to safely use, store and throw away your medicines at www.disposemymeds.org.      Notice  As of 1/5/2018 12:10 PM    You have not been prescribed any medications.

## 2018-01-05 NOTE — LETTER
"    1/5/2018         RE: Patrice Nicole  [address]  [place] [address]        Dear Colleague,    Thank you for referring your patient, Patrice Nicole, to the [facility] - PRIMARY CARE SKIN. Please see a copy of my visit note below.    [facility] PRIMARY CARE SKIN    CC : Lesion(s)  SUBJECTIVE:                                                    Patrice Nicole is a 32 year old male who presents to clinic today for follow-up of a keloid on the back of the scalp.    Last treatment : 12/1/17  Treatment : intralesional triamcinolone 8 mg injected into two keloids  Treatment number : 4  Response : Keloid may be flatter and \"less solid\".    Personal history of skin cancer : NO.  Family history of skin cancer : NO.    Sun Exposure History  Previous history of significant sun exposure:  Blistering sunburns : NO  Tanning beds : NO.  Sunscreen Use : NO.  Sun-protective clothing use : No  Wide-brimmed hats : Yes  Sunglasses : Yes  Seeks shade : No    Occupation : pest control (both indoor & outdoor).    Refer to electronic medical record (EMR) for past medical history and medications.    INTEGUMENTARY/SKIN: POSITIVE for non-healing lesion  ROS : 14 point review of systems was negative except the symptoms listed above in the HPI.    This document serves as a record of the services and decisions personally performed and made by Alivia Cuellar MD. It was created on her behalf by Phong Bonilla, a trained medical scribe.  The creation of this document is based on the scribe's personal observations and the provider's statements to the medical scribe.  Phong Bonilla, January 5, 2018 12:06 PM      OBJECTIVE:                                                    GENERAL: healthy, alert and no distress  SKIN: Baxter Skin Type - VI.  Neck were examined. The dermatoscope was used to help evaluate pigmented lesions.  Skin Pertinent Findings:  Mid-occipital scalp : 15 x 5 mm in size, raised, softer in texture lesion consistent with " keloid.  Name : Triamcinolone acetonide (Kenalog) injection.  Indication : keloids.  Completed by : Alivia Cuellar MD  Note : Prior to the procedure, we discussed possible hypo- and hyperpigmentation or depression of the skin post-procedure. Explained that more then one injection, up to six injections, spaced 4-6 weeks apart may be necessary.    Cryotherapy burst of 5 seconds prior to injection as documented below.    Skin was cleansed with alcohol. 0.5 mL triamcinolone acetonide 40 mg/mL drawn up and injected intralesionally into the lesion.  Dosage 20 mg/0.5 mL  Total injected :   Into single lesion : 0.2 mL = 8 mg  Lot # : [id number] Expiration Date : Feb 2019  Distributed 0.025 ml into small keloid. Remaining into large keloid.  Blanching was present during the injection. Minimal bleeding occurred. Patient left in satisfactory condition.  Treatment number : 5.    MDM : Previously discussed other options such as surgery, laser       ASSESSMENT:                                                      Encounter Diagnosis   Name Primary?     Keloid scar Yes         PLAN:                                                    Patient Instructions   FUTURE APPOINTMENTS  Follow up in 4-6 week(s).         PROCEDURES:                                                    None.    TT : 20 minutes.  CT : 15 minutes.      The information in this document, created by the medical scribe for me, accurately reflects the services I personally performed and the decisions made by me. I have reviewed and approved this document for accuracy prior to leaving the patient care area.  Alivia Cuellar MD January 5, 2018 12:06 PM  [facility] - PRIMARY CARE SKIN    Again, thank you for allowing me to participate in the care of your patient.        Sincerely,        Cristal Cuellar MD

## 2018-02-09 ENCOUNTER — OFFICE VISIT (OUTPATIENT)
Dept: FAMILY MEDICINE | Facility: CLINIC | Age: 33
End: 2018-02-09
Payer: COMMERCIAL

## 2018-02-09 DIAGNOSIS — L91.0 KELOID SCAR: Primary | ICD-10-CM

## 2018-02-09 PROCEDURE — 11900 INJECT SKIN LESIONS </W 7: CPT | Performed by: FAMILY MEDICINE

## 2018-02-09 RX ORDER — TRIAMCINOLONE ACETONIDE 40 MG/ML
12 INJECTION, SUSPENSION INTRA-ARTICULAR; INTRAMUSCULAR ONCE
Qty: 0.3 ML | Refills: 0 | OUTPATIENT
Start: 2018-02-09 | End: 2018-02-09

## 2018-02-09 NOTE — PROGRESS NOTES
[facility] - PRIMARY CARE SKIN    CC : Lesion(s)  SUBJECTIVE:                                                    Patrice Nicole is a 32 year old male who presents to clinic today for follow-up of a keloid on the back of the scalp.    Last treatment : 1/5/18  Treatment : intralesional triamcinolone 8 mg injected into two keloids  Treatment number : 5  Response : The keloid is still present, but may have diminished slightly in texture.    Personal history of skin cancer : NO.  Family history of skin cancer : NO.    Sun Exposure History  Previous history of significant sun exposure:  Blistering sunburns : NO  Tanning beds : NO.  Sunscreen Use : NO.  Sun-protective clothing use : No  Wide-brimmed hats : Yes  Sunglasses : Yes  Seeks shade : No    Occupation : pest control (both indoor & outdoor).    Refer to electronic medical record (EMR) for past medical history and medications.    INTEGUMENTARY/SKIN: POSITIVE for non-healing lesion  ROS : 14 point review of systems was negative except the symptoms listed above in the HPI.    This document serves as a record of the services and decisions personally performed and made by Alivia Cuellar MD. It was created on her behalf by Phong Bonilla, a trained medical scribe.  The creation of this document is based on the scribe's personal observations and the provider's statements to the medical scribe.  Phong Bonilla, February 9, 2018 10:22 AM      OBJECTIVE:                                                    GENERAL: healthy, alert and no distress  SKIN: Baxter Skin Type - VI.  Neck were examined. The dermatoscope was used to help evaluate pigmented lesions.  Skin Pertinent Findings:  Mid-occipital scalp : 15 x 5 mm in size, raised slightly flatter, softer in texture lesion consistent with keloid.  Name : Triamcinolone acetonide (Kenalog) injection.  Indication : keloids.  Completed by : Alivia Cuellar MD  Note : Prior to the procedure, we discussed possible hypo- and  hyperpigmentation or depression of the skin post-procedure. Explained that more then one injection, up to six injections, spaced 4-6 weeks apart may be necessary.    Cryotherapy burst of 5 seconds prior to injection as documented below.    Skin was cleansed with alcohol. 0.3 mL triamcinolone acetonide 40 mg/mL drawn up and injected intralesionally into the lesion.  Total injected :   Into single lesion : 0.3 mL = 12 mg  Lot # : [id number] Expiration Date : Feb 2019  Blanching was present during the injection. Minimal bleeding occurred. Patient left in satisfactory condition.  Treatment number : 5.    MDM : Previously discussed other options such as surgery, laser       ASSESSMENT:                                                      Encounter Diagnosis   Name Primary?     Keloid scar Yes         PLAN:                                                    Patient Instructions   FUTURE APPOINTMENTS  Follow up in 4-6 weeks.         PROCEDURES:                                                    None.    TT : 20 minutes.  CT : 15 minutes.      The information in this document, created by the medical scribe for me, accurately reflects the services I personally performed and the decisions made by me. I have reviewed and approved this document for accuracy prior to leaving the patient care area.  Alivia Cuellar MD February 9, 2018 10:22 AM  [facility] - PRIMARY CARE SKIN

## 2018-02-09 NOTE — MR AVS SNAPSHOT
"              After Visit Summary   2018    Patrice Nicole    MRN: [id number]           Patient Information     Date Of Birth          1985        Visit Information        Provider Department      2018 10:20 AM Cristal Cuellar MD [facility] [place]        Today's Diagnoses     Keloid scar    -  1      Care Instructions    FUTURE APPOINTMENTS  Follow up in 4-6 weeks.          Follow-ups after your visit        Who to contact     If you have questions or need follow up information about today's clinic visit or your schedule please contact [facility] [place] directly at [phone].  Normal or non-critical lab and imaging results will be communicated to you by MyChart, letter or phone within 4 business days after the clinic has received the results. If you do not hear from us within 7 days, please contact the clinic through Boutique Windowhart or phone. If you have a critical or abnormal lab result, we will notify you by phone as soon as possible.  Submit refill requests through Aibo or call your pharmacy and they will forward the refill request to us. Please allow 3 business days for your refill to be completed.          Additional Information About Your Visit        MyChart Information     Aibo lets you send messages to your doctor, view your test results, renew your prescriptions, schedule appointments and more. To sign up, go to [email or web address] . Click on \"Log in\" on the left side of the screen, which will take you to the Welcome page. Then click on \"Sign up Now\" on the right side of the page.     You will be asked to enter the access code listed below, as well as some personal information. Please follow the directions to create your username and password.     Your access code is: [email or web address]  Expires: 2018 12:10 PM     Your access code will  in 90 days. If you need help or a new code, please call your [facility] or [phone].        Care EveryWhere " ID     This is your Care EveryWhere ID. This could be used by other organizations to access your [place] medical records  [id number]         Blood Pressure from Last 3 Encounters:   06/09/17 (!) 146/110    Weight from Last 3 Encounters:   06/09/17 248 lb 11.2 oz (112.8 kg)              Today, you had the following     No orders found for display       Primary Care Provider Office Phone # Fax #    [facility] [phone]       [address]  [facility] [address]        Equal Access to Services     [facility]: Hadii aad ku hadasho Soomaali, waaxda luqadaha, qaybta kaalmada adeegyada, waxay idiin hayaan andres loza . So [facility] [phone].    ATENCIÓN: Si habla español, tiene a teresa disposición servicios gratuitos de asistencia lingüística. Llame al [phone].    We comply with applicable federal civil rights laws and [place] laws. We do not discriminate on the basis of race, color, national origin, age, disability, sex, sexual orientation, or gender identity.            Thank you!     Thank you for choosing [facility] [place]  for your care. Our goal is always to provide you with excellent care. Hearing back from our patients is one way we can continue to improve our services. Please take a few minutes to complete the written survey that you may receive in the mail after your visit with us. Thank you!             Your Updated Medication List - Protect others around you: Learn how to safely use, store and throw away your medicines at www.disposemymeds.org.      Notice  As of 2/9/2018 10:28 AM    You have not been prescribed any medications.

## 2018-03-22 ENCOUNTER — OFFICE VISIT (OUTPATIENT)
Dept: FAMILY MEDICINE | Facility: CLINIC | Age: 33
End: 2018-03-22
Payer: COMMERCIAL

## 2018-03-22 VITALS — DIASTOLIC BLOOD PRESSURE: 100 MMHG | SYSTOLIC BLOOD PRESSURE: 169 MMHG

## 2018-03-22 DIAGNOSIS — L91.0 KELOID SCAR: Primary | ICD-10-CM

## 2018-03-22 PROCEDURE — 11900 INJECT SKIN LESIONS </W 7: CPT | Performed by: FAMILY MEDICINE

## 2018-03-22 RX ORDER — TRIAMCINOLONE ACETONIDE 40 MG/ML
8 INJECTION, SUSPENSION INTRA-ARTICULAR; INTRAMUSCULAR ONCE
Qty: 0.2 ML | Refills: 0 | OUTPATIENT
Start: 2018-03-22 | End: 2018-03-22

## 2018-03-22 NOTE — MR AVS SNAPSHOT
"              After Visit Summary   3/22/2018    Patrice Nicole    MRN: [id number]           Patient Information     Date Of Birth          1985        Visit Information        Provider Department      3/22/2018 10:00 AM [facility]        Today's Diagnoses     Keloid scar    -  1      Care Instructions    FUTURE APPOINTMENTS  Follow up in 4-6 week(s).          Follow-ups after your visit        Who to contact     If you have questions or need follow up information about today's clinic visit or your schedule please contact [facility] directly at [phone].  Normal or non-critical lab and imaging results will be communicated to you by MyChart, letter or phone within 4 business days after the clinic has received the results. If you do not hear from us within 7 days, please contact the clinic through Knohart or phone. If you have a critical or abnormal lab result, we will notify you by phone as soon as possible.  Submit refill requests through TriPlay or call your pharmacy and they will forward the refill request to us. Please allow 3 business days for your refill to be completed.          Additional Information About Your Visit        MyChart Information     TriPlay lets you send messages to your doctor, view your test results, renew your prescriptions, schedule appointments and more. To sign up, go to [email or web address] . Click on \"Log in\" on the left side of the screen, which will take you to the Welcome page. Then click on \"Sign up Now\" on the right side of the page.     You will be asked to enter the access code listed below, as well as some personal information. Please follow the directions to create your username and password.     Your access code is: [email or web address]  Expires: 2018  1:10 PM     Your access code will  in 90 days. If you need help or a new code, please call your [facility] or [phone].        Care " EveryWhere ID     This is your Care EveryWhere ID. This could be used by other organizations to access your [facility] medical records  [id number]         Blood Pressure from Last 3 Encounters:   06/09/17 (!) 146/110    Weight from Last 3 Encounters:   06/09/17 248 lb 11.2 oz (112.8 kg)              Today, you had the following     No orders found for display       Primary Care Provider Office Phone # Fax #    [facility] [phone]       [address]  [facility] [address]        Equal Access to Services     [facility]: Hadii aad ku hadasho Soomaali, waaxda luqadaha, qaybta kaalmada adeegyada, waxay luisin hayjohn loza . So [facility] [phone].    ATENCIÓN: Si habla español, tiene a teresa disposición servicios gratuitos de asistencia lingüística. Llame al [phone].    We comply with applicable federal civil rights laws and [place] laws. We do not discriminate on the basis of race, color, national origin, age, disability, sex, sexual orientation, or gender identity.            Thank you!     Thank you for choosing [facility] [place]  for your care. Our goal is always to provide you with excellent care. Hearing back from our patients is one way we can continue to improve our services. Please take a few minutes to complete the written survey that you may receive in the mail after your visit with us. Thank you!             Your Updated Medication List - Protect others around you: Learn how to safely use, store and throw away your medicines at www.disposemymeds.org.      Notice  As of 3/22/2018 10:18 AM    You have not been prescribed any medications.

## 2018-03-22 NOTE — PROGRESS NOTES
[facility] - PRIMARY CARE SKIN    CC : Lesion(s)  SUBJECTIVE:                                                    Patrice Nicole is a 32 year old male who presents to clinic today for follow-up of a keloid on the back of the scalp.    Last treatment : 2/9/18  Treatment : intralesional triamcinolone 12 mg injected  Treatment number : 6, since 8/7/17  Response : He does not feel that the keloid size has diminished.    Personal history of skin cancer : NO.  Family history of skin cancer : NO.    Sun Exposure History  Previous history of significant sun exposure:  Blistering sunburns : NO  Tanning beds : NO.  Sunscreen Use : NO.  Sun-protective clothing use : No  Wide-brimmed hats : Yes  Sunglasses : Yes  Seeks shade : No    Occupation : pest control (both indoor & outdoor).    Refer to electronic medical record (EMR) for past medical history and medications.    INTEGUMENTARY/SKIN: POSITIVE for non-healing lesion  ROS : 14 point review of systems was negative except the symptoms listed above in the HPI.    This document serves as a record of the services and decisions personally performed and made by Alivia Cuellar MD. It was created on her behalf by Phong Bonilla, a trained medical scribe.  The creation of this document is based on the scribe's personal observations and the provider's statements to the medical scribe.  Phong Bonilla, March 22, 2018 10:09 AM      OBJECTIVE:                                                    GENERAL: healthy, alert and no distress  SKIN: Baxter Skin Type - VI.  Neck were examined. The dermatoscope was used to help evaluate pigmented lesions.  Skin Pertinent Findings:  Mid-occipital scalp : Same size 15 x 5 mm in size, but softer in texture and lighter in color lesion consistent with keloid. Three other smaller 3 mm in size keloids are the same size.  Name : Triamcinolone acetonide (Kenalog) injection.  Indication : keloids.  Completed by : Alivia Cuellar MD  Note : Prior to the procedure,  we discussed possible hypo- and hyperpigmentation or depression of the skin post-procedure. Explained that more then one injection, up to six injections, spaced 4-6 weeks apart may be necessary.    Cryotherapy burst of 10 seconds prior to injection as documented below.    Skin was cleansed with alcohol. 0.5 mL triamcinolone acetonide 40 mg/mL drawn up and injected intralesionally into the lesion.  Total injected :   0.2 mL = 8 mg  Lot # : [id number] Expiration Date : Aug 2019  Blanching was present during the injection. Minimal bleeding occurred. Patient left in satisfactory condition.  Treatment number : 7.    MDM : Previously discussed other options such as surgery, laser       ASSESSMENT:                                                      Encounter Diagnosis   Name Primary?     Keloid scar Yes         PLAN:                                                    Patient Instructions   FUTURE APPOINTMENTS  Follow up in 4-6 week(s).         PROCEDURES:                                                    None.    TT : 20 minutes.  CT : 15 minutes.      The information in this document, created by the medical scribe for me, accurately reflects the services I personally performed and the decisions made by me. I have reviewed and approved this document for accuracy prior to leaving the patient care area.  Alivia Cuellar MD March 22, 2018 10:08 AM  [facility] - PRIMARY CARE SKIN

## 2018-03-22 NOTE — LETTER
3/22/2018         RE: Patrice Nicole  [address]  [place] [facility] [address]        Dear Colleague,    Thank you for referring your patient, Patrice Nicole, to the [facility]. Please see a copy of my visit note below.    [facility] - PRIMARY CARE SKIN    CC : Lesion(s)  SUBJECTIVE:                                                    Patrice Nicole is a 32 year old male who presents to clinic today for follow-up of a keloid on the back of the scalp.    Last treatment : 2/9/18  Treatment : intralesional triamcinolone 12 mg injected  Treatment number : 6, since 8/7/17  Response : He does not feel that the keloid size has diminished.    Personal history of skin cancer : NO.  Family history of skin cancer : NO.    Sun Exposure History  Previous history of significant sun exposure:  Blistering sunburns : NO  Tanning beds : NO.  Sunscreen Use : NO.  Sun-protective clothing use : No  Wide-brimmed hats : Yes  Sunglasses : Yes  Seeks shade : No    Occupation : pest control (both indoor & outdoor).    Refer to electronic medical record (EMR) for past medical history and medications.    INTEGUMENTARY/SKIN: POSITIVE for non-healing lesion  ROS : 14 point review of systems was negative except the symptoms listed above in the HPI.    This document serves as a record of the services and decisions personally performed and made by Alivia Cuellar MD. It was created on her behalf by Phong Bonilla, a trained medical scribe.  The creation of this document is based on the scribe's personal observations and the provider's statements to the medical scribe.  Phong Bonilla, March 22, 2018 10:09 AM      OBJECTIVE:                                                    GENERAL: healthy, alert and no distress  SKIN: Baxter Skin Type - VI.  Neck were examined. The dermatoscope was used to help evaluate pigmented lesions.  Skin Pertinent Findings:  Mid-occipital scalp : Same size 15 x 5 mm in size, but softer in texture and lighter  in color lesion consistent with keloid. Three other smaller 3 mm in size keloids are the same size.  Name : Triamcinolone acetonide (Kenalog) injection.  Indication : keloids.  Completed by : Alivia Cuellar MD  Note : Prior to the procedure, we discussed possible hypo- and hyperpigmentation or depression of the skin post-procedure. Explained that more then one injection, up to six injections, spaced 4-6 weeks apart may be necessary.    Cryotherapy burst of 10 seconds prior to injection as documented below.    Skin was cleansed with alcohol. 0.5 mL triamcinolone acetonide 40 mg/mL drawn up and injected intralesionally into the lesion.  Total injected :   0.2 mL = 8 mg  Lot # : [id number] Expiration Date : Aug 2019  Blanching was present during the injection. Minimal bleeding occurred. Patient left in satisfactory condition.  Treatment number : 7.    MDM : Previously discussed other options such as surgery, laser       ASSESSMENT:                                                      Encounter Diagnosis   Name Primary?     Keloid scar Yes         PLAN:                                                    Patient Instructions   FUTURE APPOINTMENTS  Follow up in 4-6 week(s).         PROCEDURES:                                                    None.    TT : 20 minutes.  CT : 15 minutes.      The information in this document, created by the medical scribe for me, accurately reflects the services I personally performed and the decisions made by me. I have reviewed and approved this document for accuracy prior to leaving the patient care area.  Alivia Cuellar MD March 22, 2018 10:08 AM  [facility] - PRIMARY CARE SKIN    Again, thank you for allowing me to participate in the care of your patient.        Sincerely,        Cristal Cuellar MD

## 2018-06-01 ENCOUNTER — OFFICE VISIT (OUTPATIENT)
Dept: FAMILY MEDICINE | Facility: CLINIC | Age: 33
End: 2018-06-01
Payer: COMMERCIAL

## 2018-06-01 VITALS
DIASTOLIC BLOOD PRESSURE: 110 MMHG | OXYGEN SATURATION: 97 % | HEART RATE: 74 BPM | RESPIRATION RATE: 20 BRPM | HEIGHT: 70 IN | TEMPERATURE: 98.5 F | SYSTOLIC BLOOD PRESSURE: 150 MMHG | WEIGHT: 253.5 LBS | BODY MASS INDEX: 36.29 KG/M2

## 2018-06-01 DIAGNOSIS — M54.40 ACUTE BILATERAL LOW BACK PAIN WITH SCIATICA, SCIATICA LATERALITY UNSPECIFIED: Primary | ICD-10-CM

## 2018-06-01 DIAGNOSIS — I10 BENIGN ESSENTIAL HYPERTENSION: ICD-10-CM

## 2018-06-01 PROBLEM — R03.0 ELEVATED BLOOD PRESSURE READING WITHOUT DIAGNOSIS OF HYPERTENSION: Status: RESOLVED | Noted: 2017-06-09 | Resolved: 2018-06-01

## 2018-06-01 LAB
ANION GAP SERPL CALCULATED.3IONS-SCNC: 7 MMOL/L (ref 3–14)
BUN SERPL-MCNC: 9 MG/DL (ref 7–30)
CALCIUM SERPL-MCNC: 10 MG/DL (ref 8.5–10.1)
CHLORIDE SERPL-SCNC: 100 MMOL/L (ref 94–109)
CO2 SERPL-SCNC: 30 MMOL/L (ref 20–32)
CREAT SERPL-MCNC: 0.88 MG/DL (ref 0.66–1.25)
GFR SERPL CREATININE-BSD FRML MDRD: >90 ML/MIN/1.7M2
GLUCOSE SERPL-MCNC: 97 MG/DL (ref 70–99)
POTASSIUM SERPL-SCNC: 4.1 MMOL/L (ref 3.4–5.3)
SODIUM SERPL-SCNC: 137 MMOL/L (ref 133–144)

## 2018-06-01 PROCEDURE — 80048 BASIC METABOLIC PNL TOTAL CA: CPT | Performed by: NURSE PRACTITIONER

## 2018-06-01 PROCEDURE — 36415 COLL VENOUS BLD VENIPUNCTURE: CPT | Performed by: NURSE PRACTITIONER

## 2018-06-01 PROCEDURE — 99214 OFFICE O/P EST MOD 30 MIN: CPT | Performed by: NURSE PRACTITIONER

## 2018-06-01 RX ORDER — METHOCARBAMOL 750 MG/1
750 TABLET, FILM COATED ORAL 3 TIMES DAILY PRN
Qty: 30 TABLET | Refills: 0 | Status: SHIPPED | OUTPATIENT
Start: 2018-06-01 | End: 2018-09-04

## 2018-06-01 RX ORDER — HYDROCHLOROTHIAZIDE 25 MG/1
25 TABLET ORAL DAILY
Qty: 30 TABLET | Refills: 0 | Status: SHIPPED | OUTPATIENT
Start: 2018-06-01 | End: 2018-07-25

## 2018-06-01 ASSESSMENT — PAIN SCALES - GENERAL: PAINLEVEL: MILD PAIN (3)

## 2018-06-01 NOTE — MR AVS SNAPSHOT
After Visit Summary   6/1/2018    Patrice Nicole    MRN: [id number]           Patient Information     Date Of Birth          1985        Visit Information        Provider Department      6/1/2018 9:40 AM Carla Mullins NP [facility]        Today's Diagnoses     Benign essential hypertension    -  1    Acute bilateral low back pain with sciatica, sciatica laterality unspecified          Care Instructions    Call or message if BP is <100/60 OR >150/100   Call if muscle cramps, if your getting TOO dizzy  Work diet/exercise, watch salt intake   follow up with NP in 1 month          Follow-ups after your visit        Additional Services     SPORTS MEDICINE REFERRAL       Your provider has referred you to:  FM: [facility] [phone]   [email or web address]    Please be aware that coverage of these services is subject to the terms and limitations of your health insurance plan.  Call member services at your health plan with any benefit or coverage questions.      Please bring the following to your appointment:    >>   Any x-rays, CTs or MRIs which have been performed.  Contact the facility where they were done to arrange for  prior to your scheduled appointment.    >>   List of current medications   >>   This referral request   >>   Any documents/labs given to you for this referral                  Who to contact     If you have questions or need follow up information about today's clinic visit or your schedule please contact [facility] directly at [phone].  Normal or non-critical lab and imaging results will be communicated to you by MyChart, letter or phone within 4 business days after the clinic has received the results. If you do not hear from us within 7 days, please contact the clinic through MyChart or phone. If you have a critical or abnormal lab result, we will notify you by phone as soon as  "possible.  Submit refill requests through [email or web address] or call your pharmacy and they will forward the refill request to us. Please allow 3 business days for your refill to be completed.          Additional Information About Your Visit        [email or web address] Information     [email or web address] lets you send messages to your doctor, view your test results, renew your prescriptions, schedule appointments and more. To sign up, go to [email or web address] . Click on \"Log in\" on the left side of the screen, which will take you to the Welcome page. Then click on \"Sign up Now\" on the right side of the page.     You will be asked to enter the access code listed below, as well as some personal information. Please follow the directions to create your username and password.     Your access code is: [email or web address]  Expires: 2018  9:44 AM     Your access code will  in 90 days. If you need help or a new code, please call your [facility] clinic or [phone].        Care EveryWhere ID     This is your Care EveryWhere ID. This could be used by other organizations to access your [facility] medical records  [id number]        Your Vitals Were     Pulse Temperature Respirations Height Pulse Oximetry BMI (Body Mass Index)    74 98.5  F (36.9  C) (Oral) 20 1.765 m (5' 9.5\") 97% 36.9 kg/m2       Blood Pressure from Last 3 Encounters:   18 (!) 150/110   18 (!) 169/100   17 (!) 146/110    Weight from Last 3 Encounters:   18 115 kg (253 lb 8 oz)   17 112.8 kg (248 lb 11.2 oz)              We Performed the Following     Basic metabolic panel     SPORTS MEDICINE REFERRAL          Today's Medication Changes          These changes are accurate as of 18 10:17 AM.  If you have any questions, ask your nurse or doctor.               Start taking these medicines.        Dose/Directions    hydrochlorothiazide 25 MG tablet   Commonly known as:  HYDRODIURIL   Used for:  Benign essential hypertension   Started by:  Carla Mullins NP     "    Dose:  25 mg   Take 1 tablet (25 mg) by mouth daily   Quantity:  30 tablet   Refills:  0            Where to get your medicines      These medications were sent to [place] [address] [facility] [place] [id number]  [address], [facility] [address]     Phone:  [phone]     hydrochlorothiazide 25 MG tablet                Primary Care Provider Office Phone # Fax #    [facility] [phone]       [address] [facility] [address]        Equal Access to Services     [facility]: Hadii aad ku hadasho Soomaali, waaxda luqadaha, qaybta kaalmada adeegyada, waxay luisin hayjohn loza . So [facility] [phone].    ATENCIÓN: Si habla español, tiene a teresa disposición servicios gratuitos de asistencia lingüística. Llame al [phone].    We comply with applicable federal civil rights laws and [place] laws. We do not discriminate on the basis of race, color, national origin, age, disability, sex, sexual orientation, or gender identity.            Thank you!     Thank you for choosing [facility]  for your care. Our goal is always to provide you with excellent care. Hearing back from our patients is one way we can continue to improve our services. Please take a few minutes to complete the written survey that you may receive in the mail after your visit with us. Thank you!             Your Updated Medication List - Protect others around you: Learn how to safely use, store and throw away your medicines at www.disposemymeds.org.          This list is accurate as of 6/1/18 10:17 AM.  Always use your most recent med list.                   Brand Name Dispense Instructions for use Diagnosis    hydrochlorothiazide 25 MG tablet    HYDRODIURIL    30 tablet    Take 1 tablet (25 mg) by mouth daily    Benign essential hypertension

## 2018-06-01 NOTE — PATIENT INSTRUCTIONS
Call or message if BP is <100/60 OR >150/100   Call if muscle cramps, if your getting TOO dizzy  Work diet/exercise, watch salt intake   follow up with NP in 1 month

## 2018-06-01 NOTE — PROGRESS NOTES
SUBJECTIVE:   Patrice Nicole is a 32 year old male who presents to clinic today for the following health issues:      Back Pain       Duration: a month ago        Specific cause: none    Description:   Location of pain: low back left  Character of pain: tightness/sharp  Pain radiation: radiates to hips/thigh  New numbness or weakness in legs, not attributed to pain:  YES- weakness    Intensity: Currently 3/10    History:   Pain interferes with job: No  History of back problems: no prior back problems  Any previous MRI or X-rays: Yes- at [place].  Date   Sees a specialist for back pain:  No  Therapies tried without relief: chiropractor-going about 2x/week for past month. Not helping much. Had x-ray done there-has decreased lumbar curve.     Alleviating factors:   Improved by: nothing      Precipitating factors:  Worsened by: Lifting, Bending and Standing    Accompanying Signs & Symptoms:  Risk of Fracture:  None  Risk of Cauda Equina:  None  Risk of Infection:  None  Risk of Cancer:  None  Risk of Ankylosing Spondylitis:  Onset at age <35, male, AND morning back stiffness. no       To ER 5/4/18. Was given prednisone 40 mg x 5 days. Flexeril 10 mg.    Pain worse in the morning. Eases up some during the day then worsens again in the evening. Has a tilt table which helps some. Works year-round, has to be outside at times too. Pain radiates down his legs through his hips and thighs. Did not feel the steroid helped his symptoms. The muscle relaxer didn't help the pain but helped him sleep better.     HTN: Mother's brothers have HTN. Noted in EPIC everytime he has high blood pressure. We talked about weight loss, adding cardio to his weight lifting routine, diet changes. Risks of having high blood pressure, genetic component.       Problem list and histories reviewed & adjusted, as indicated.  Additional history: as documented    Patient Active Problem List   Diagnosis     Benign neoplasm of scalp     Benign essential  "hypertension     Acute bilateral low back pain with sciatica, sciatica laterality unspecified     Past Surgical History:   Procedure Laterality Date     ARTHROSCOPIC RECONSTRUCTION ANTERIOR CRUCIATE LIGAMENT  2011     forgein  2014    glass removed from right lower leg       Social History   Substance Use Topics     Smoking status: Never Smoker     Smokeless tobacco: Never Used     Alcohol use Yes      Comment: wine on Friday     Family History   Problem Relation Age of Onset     Hypertension Mother      DIABETES Maternal Grandmother      Hypertension Maternal Grandmother      DIABETES Paternal Grandfather          Current Outpatient Prescriptions   Medication Sig Dispense Refill     hydrochlorothiazide (HYDRODIURIL) 25 MG tablet Take 1 tablet (25 mg) by mouth daily 30 tablet 0     methocarbamol (ROBAXIN) 750 MG tablet Take 1 tablet (750 mg) by mouth 3 times daily as needed for muscle spasms 30 tablet 0     Allergies   Allergen Reactions     Penicillins Rash     Other reaction(s): Confusion       Reviewed and updated as needed this visit by clinical staff  Tobacco  Allergies  Meds  Problems  Med Hx  Surg Hx  Fam Hx  Soc Hx        Reviewed and updated as needed this visit by Provider  Allergies  Meds  Problems  Fam Hx         ROS:  Constitutional, HEENT, cardiovascular, pulmonary, MS as above systems are negative, except as otherwise noted.    OBJECTIVE:     BP (!) 150/110 (BP Location: Right arm, Patient Position: Sitting, Cuff Size: Adult Large)  Pulse 74  Temp 98.5  F (36.9  C) (Oral)  Resp 20  Ht 1.765 m (5' 9.5\")  Wt 115 kg (253 lb 8 oz)  SpO2 97%  BMI 36.9 kg/m2  Body mass index is 36.9 kg/(m^2).  GENERAL: healthy, alert and no distress  RESP: lungs clear to auscultation - no rales, rhonchi or wheezes  CV: regular rate and rhythm, normal S1 S2, no S3 or S4, no murmur, click or rub, no peripheral edema  MS: no gross musculoskeletal defects noted, no edema  BACK: no CVA tenderness, no paralumbar " tenderness. Slight low back pain to palpation. +left leg straight leg test with pain into left side of low back    Diagnostic Test Results:  No results found for this or any previous visit (from the past 24 hour(s)).    ASSESSMENT/PLAN:         1. Benign essential hypertension  Start on medication, discussed side effects. Return in 1 month for follow up. NO refills until seen in clinic.   - hydrochlorothiazide (HYDRODIURIL) 25 MG tablet; Take 1 tablet (25 mg) by mouth daily  Dispense: 30 tablet; Refill: 0  - Basic metabolic panel    2. Acute bilateral low back pain with sciatica, sciatica laterality unspecified  Follow up with PT/sports medicine. Work on diet/exercise. If not improving, consider MRI as x-ray was done at chiropractic and normal  - SPORTS MEDICINE REFERRAL  - methocarbamol (ROBAXIN) 750 MG tablet; Take 1 tablet (750 mg) by mouth 3 times daily as needed for muscle spasms  Dispense: 30 tablet; Refill: 0    FUTURE APPOINTMENTS:       - Follow-up visit in 1 month-repeat BMP also    JORDON Mendiola, NP-C  [facility]

## 2018-07-23 DIAGNOSIS — I10 BENIGN ESSENTIAL HYPERTENSION: ICD-10-CM

## 2018-07-23 NOTE — TELEPHONE ENCOUNTER
"Requested Prescriptions   Pending Prescriptions Disp Refills     hydrochlorothiazide (HYDRODIURIL) 25 MG tablet  Last Written Prescription Date:  6/1/18  Last Fill Quantity: 30 tablet,  # refills: 0   Last office visit: 6/1/2018 with prescribing provider:  Carla Mullins NP   Future Office Visit:   30 tablet 0     Sig: Take 1 tablet (25 mg) by mouth daily    Diuretics (Including Combos) Protocol Failed    7/23/2018  9:49 AM       Failed - Blood pressure under 140/90 in past 12 months    BP Readings from Last 3 Encounters:   06/01/18 (!) 150/110   03/22/18 (!) 169/100   06/09/17 (!) 146/110                Passed - Recent (12 mo) or future (30 days) visit within the authorizing provider's specialty    Patient had office visit in the last 12 months or has a visit in the next 30 days with authorizing provider or within the authorizing provider's specialty.  See \"Patient Info\" tab in inbasket, or \"Choose Columns\" in Meds & Orders section of the refill encounter.           Passed - Patient is age 18 or older       Passed - Normal serum creatinine on file in past 12 months    Recent Labs   Lab Test  06/01/18   1018   CR  0.88             Passed - Normal serum potassium on file in past 12 months    Recent Labs   Lab Test  06/01/18   1018   POTASSIUM  4.1                   Passed - Normal serum sodium on file in past 12 months    Recent Labs   Lab Test  06/01/18   1018   NA  137                "

## 2018-07-24 RX ORDER — HYDROCHLOROTHIAZIDE 25 MG/1
25 TABLET ORAL DAILY
Qty: 30 TABLET | Refills: 0 | OUTPATIENT
Start: 2018-07-24

## 2018-07-24 NOTE — TELEPHONE ENCOUNTER
Routing refill request to provider for review/approval because:  BP is out of range.    Ike Bradford RN, BSN

## 2018-07-25 RX ORDER — HYDROCHLOROTHIAZIDE 25 MG/1
25 TABLET ORAL DAILY
Qty: 5 TABLET | Refills: 0 | Status: SHIPPED | OUTPATIENT
Start: 2018-07-25 | End: 2018-09-04

## 2018-07-27 ENCOUNTER — OFFICE VISIT (OUTPATIENT)
Dept: FAMILY MEDICINE | Facility: CLINIC | Age: 33
End: 2018-07-27
Payer: COMMERCIAL

## 2018-07-27 VITALS
TEMPERATURE: 98.2 F | HEART RATE: 75 BPM | OXYGEN SATURATION: 100 % | DIASTOLIC BLOOD PRESSURE: 110 MMHG | WEIGHT: 255.4 LBS | HEIGHT: 70 IN | RESPIRATION RATE: 20 BRPM | SYSTOLIC BLOOD PRESSURE: 154 MMHG | BODY MASS INDEX: 36.56 KG/M2

## 2018-07-27 DIAGNOSIS — I10 BENIGN ESSENTIAL HYPERTENSION: Primary | ICD-10-CM

## 2018-07-27 DIAGNOSIS — L91.0 KELOID SCAR: ICD-10-CM

## 2018-07-27 LAB
ANION GAP SERPL CALCULATED.3IONS-SCNC: 8 MMOL/L (ref 3–14)
BUN SERPL-MCNC: 7 MG/DL (ref 7–30)
CALCIUM SERPL-MCNC: 9.3 MG/DL (ref 8.5–10.1)
CHLORIDE SERPL-SCNC: 102 MMOL/L (ref 94–109)
CO2 SERPL-SCNC: 30 MMOL/L (ref 20–32)
CREAT SERPL-MCNC: 0.9 MG/DL (ref 0.66–1.25)
GFR SERPL CREATININE-BSD FRML MDRD: >90 ML/MIN/1.7M2
GLUCOSE SERPL-MCNC: 100 MG/DL (ref 70–99)
POTASSIUM SERPL-SCNC: 4.1 MMOL/L (ref 3.4–5.3)
SODIUM SERPL-SCNC: 140 MMOL/L (ref 133–144)

## 2018-07-27 PROCEDURE — 36415 COLL VENOUS BLD VENIPUNCTURE: CPT | Performed by: NURSE PRACTITIONER

## 2018-07-27 PROCEDURE — 99214 OFFICE O/P EST MOD 30 MIN: CPT | Performed by: NURSE PRACTITIONER

## 2018-07-27 PROCEDURE — 80048 BASIC METABOLIC PNL TOTAL CA: CPT | Performed by: NURSE PRACTITIONER

## 2018-07-27 RX ORDER — HYDROCHLOROTHIAZIDE 50 MG/1
50 TABLET ORAL DAILY
Qty: 30 TABLET | Refills: 0 | Status: SHIPPED | OUTPATIENT
Start: 2018-07-27 | End: 2018-08-24

## 2018-07-27 ASSESSMENT — PAIN SCALES - GENERAL: PAINLEVEL: NO PAIN (0)

## 2018-07-27 NOTE — MR AVS SNAPSHOT
After Visit Summary   7/27/2018    Patrice Nicole    MRN: [id number]           Patient Information     Date Of Birth          1985        Visit Information        Provider Department      7/27/2018 9:40 AM Carla Mullins NP [facility]        Today's Diagnoses     Benign essential hypertension    -  1    Keloid scar          Care Instructions    Return in 2-3 weeks  Repeat kidney function  Call if BP: > 150/100          Follow-ups after your visit        Additional Services     GENERAL SURG ADULT REFERRAL       Your provider has referred you to: RICARDO: [facility] Maya Perez [phone]   [email or web address]    Please be aware that coverage of these services is subject to the terms and limitations of your health insurance plan.  Call member services at your health plan with any benefit or coverage questions.      Please bring the following with you to your appointment:    (1) Any X-Rays, CTs or MRIs which have been performed.  Contact the facility where they were done to arrange for  prior to your scheduled appointment.   (2) List of current medications   (3) This referral request   (4) Any documents/labs given to you for this referral                  Who to contact     If you have questions or need follow up information about today's clinic visit or your schedule please contact [facility] directly at [phone].  Normal or non-critical lab and imaging results will be communicated to you by MyChart, letter or phone within 4 business days after the clinic has received the results. If you do not hear from us within 7 days, please contact the clinic through MyChart or phone. If you have a critical or abnormal lab result, we will notify you by phone as soon as possible.  Submit refill requests through [employer] or call your pharmacy and they will forward the refill request to us. Please allow 3 business days for your  "refill to be completed.          Additional Information About Your Visit        [employer] Information     [facility] gives you secure access to your electronic health record. If you see a primary care provider, you can also send messages to your care team and make appointments. If you have questions, please call your primary care clinic.  If you do not have a primary care provider, please call [phone] and they will assist you.        Care EveryWhere ID     This is your Care EveryWhere ID. This could be used by other organizations to access your [place] medical records  [id number]        Your Vitals Were     Pulse Temperature Respirations Height Pulse Oximetry BMI (Body Mass Index)    75 98.2  F (36.8  C) (Oral) 20 1.765 m (5' 9.5\") 100% 37.18 kg/m2       Blood Pressure from Last 3 Encounters:   07/27/18 (!) 154/110   06/01/18 (!) 150/110   03/22/18 (!) 169/100    Weight from Last 3 Encounters:   07/27/18 115.8 kg (255 lb 6.4 oz)   06/01/18 115 kg (253 lb 8 oz)   06/09/17 112.8 kg (248 lb 11.2 oz)              We Performed the Following     Basic metabolic panel     GENERAL SURG ADULT REFERRAL          Today's Medication Changes          These changes are accurate as of 7/27/18  9:58 AM.  If you have any questions, ask your nurse or doctor.               These medicines have changed or have updated prescriptions.        Dose/Directions    * hydrochlorothiazide 25 MG tablet   Commonly known as:  HYDRODIURIL   This may have changed:  Another medication with the same name was added. Make sure you understand how and when to take each.   Used for:  Benign essential hypertension   Changed by:  Carla Mullins NP        Dose:  25 mg   Take 1 tablet (25 mg) by mouth daily   Quantity:  5 tablet   Refills:  0       * hydrochlorothiazide 50 MG tablet   Commonly known as:  HYDRODIURIL   This may have changed:  You were already taking a medication with the same name, and this prescription was added. Make sure you understand how " and when to take each.   Used for:  Benign essential hypertension   Changed by:  Carla Mullins NP        Dose:  50 mg   Take 1 tablet (50 mg) by mouth daily   Quantity:  30 tablet   Refills:  0       * Notice:  This list has 2 medication(s) that are the same as other medications prescribed for you. Read the directions carefully, and ask your doctor or other care provider to review them with you.         Where to get your medicines      These medications were sent to [place] [address] [place] [facility] [place] [address]  [address], [facility] [address]     Phone:  [phone]     hydrochlorothiazide 50 MG tablet                Primary Care Provider Office Phone # Fax #    [facility] [phone]       [address] [facility] [address]        Equal Access to Services     MIGUEL HOWARD : Hadii aad ku hadasho Soomaali, waaxda luqadaha, qaybta kaalmada adeegyada, jose navarrete hayjohn loza . So [facility] [phone].    ATENCIÓN: Si habla español, tiene a teresa disposición servicios gratuitos de asistencia lingüística. Llame al [phone].    We comply with applicable federal civil rights laws and [place] laws. We do not discriminate on the basis of race, color, national origin, age, disability, sex, sexual orientation, or gender identity.            Thank you!     Thank you for choosing [facility]  for your care. Our goal is always to provide you with excellent care. Hearing back from our patients is one way we can continue to improve our services. Please take a few minutes to complete the written survey that you may receive in the mail after your visit with us. Thank you!             Your Updated Medication List - Protect others around you: Learn how to safely use, store and throw away your medicines at www.disposemymeds.org.          This list is accurate as of 7/27/18  9:58 AM.  Always use your most recent med list.                   Brand  Name Dispense Instructions for use Diagnosis    * hydrochlorothiazide 25 MG tablet    HYDRODIURIL    5 tablet    Take 1 tablet (25 mg) by mouth daily    Benign essential hypertension       * hydrochlorothiazide 50 MG tablet    HYDRODIURIL    30 tablet    Take 1 tablet (50 mg) by mouth daily    Benign essential hypertension       methocarbamol 750 MG tablet    ROBAXIN    30 tablet    Take 1 tablet (750 mg) by mouth 3 times daily as needed for muscle spasms    Acute bilateral low back pain with sciatica, sciatica laterality unspecified       * Notice:  This list has 2 medication(s) that are the same as other medications prescribed for you. Read the directions carefully, and ask your doctor or other care provider to review them with you.

## 2018-07-27 NOTE — PROGRESS NOTES
SUBJECTIVE:   Patrice Nicole is a 32 year old male who presents to clinic today for the following health issues:    Hypertension Follow-up      Outpatient blood pressures are being checked at home.  Results are 140/100. 139/96    Low Salt Diet: not monitoring salt      Amount of exercise or physical activity: 2-3 days/week for an average of 45-60 minutes    Problems taking medications regularly: No    Medication side effects: none    Diet: regular (no restrictions)    BP still high. Eating more veggies/fruit. Still eating meat, uses hot sauce. Not much added salt, wife is pregnant. Exercise: working at [employer] 3x/week. More lifting than cardio. Works outside also.   Has been out of medication for about 2-3 weeks. Sometimes headaches, end of the day.     Back pain improved.     Has keloid on back of head. Wants it removed. Has had multiple injections into it in the past.     Problem list and histories reviewed & adjusted, as indicated.  Additional history: as documented    Patient Active Problem List   Diagnosis     Benign neoplasm of scalp     Benign essential hypertension     Acute bilateral low back pain with sciatica, sciatica laterality unspecified     Past Surgical History:   Procedure Laterality Date     ARTHROSCOPIC RECONSTRUCTION ANTERIOR CRUCIATE LIGAMENT  2011     forgein 2014    glass removed from right lower leg       Social History   Substance Use Topics     Smoking status: Never Smoker     Smokeless tobacco: Never Used     Alcohol use Yes      Comment: wine on Friday     Family History   Problem Relation Age of Onset     Hypertension Mother      Hypertension Father      Diabetes Maternal Grandmother      Hypertension Maternal Grandmother      Diabetes Paternal Grandfather          Current Outpatient Prescriptions   Medication Sig Dispense Refill     hydrochlorothiazide (HYDRODIURIL) 25 MG tablet Take 1 tablet (25 mg) by mouth daily 5 tablet 0     hydrochlorothiazide (HYDRODIURIL) 50 MG tablet  "Take 1 tablet (50 mg) by mouth daily 30 tablet 0     methocarbamol (ROBAXIN) 750 MG tablet Take 1 tablet (750 mg) by mouth 3 times daily as needed for muscle spasms (Patient not taking: Reported on 7/27/2018) 30 tablet 0     Allergies   Allergen Reactions     Penicillins Rash     Other reaction(s): Confusion       Reviewed and updated as needed this visit by clinical staff  Tobacco  Allergies  Meds  Problems  Med Hx  Surg Hx  Fam Hx  Soc Hx        Reviewed and updated as needed this visit by Provider  Allergies  Meds  Problems  Med Hx  Surg Hx         ROS:  Constitutional, HEENT, cardiovascular, pulmonary, gi and gu systems are negative, except as otherwise noted.    OBJECTIVE:     BP (!) 154/110 (BP Location: Right arm, Patient Position: Sitting, Cuff Size: Adult Large)  Pulse 75  Temp 98.2  F (36.8  C) (Oral)  Resp 20  Ht 1.765 m (5' 9.5\")  Wt 115.8 kg (255 lb 6.4 oz)  SpO2 100%  BMI 37.18 kg/m2  Body mass index is 37.18 kg/(m^2).  GENERAL: healthy, alert and no distress  RESP: lungs clear to auscultation - no rales, rhonchi or wheezes  CV: regular rate and rhythm, normal S1 S2, no S3 or S4, no murmur, click or rub  MS: no gross musculoskeletal defects noted, no edema  SKIN: keloid on posterior head  Diagnostic Test Results:  No results found for this or any previous visit (from the past 24 hour(s)).    ASSESSMENT/PLAN:         1. Benign essential hypertension  Increase medication. Return in 2-3 weeks for re-evaluation  - hydrochlorothiazide (HYDRODIURIL) 50 MG tablet; Take 1 tablet (50 mg) by mouth daily  Dispense: 30 tablet; Refill: 0  - Basic metabolic panel    2. Keloid scar  Refer to surgeon for removal  - GENERAL SURG ADULT REFERRAL    JORDON Mendiola, NP-C  [facility]    "

## 2018-08-13 ENCOUNTER — TELEPHONE (OUTPATIENT)
Dept: FAMILY MEDICINE | Facility: CLINIC | Age: 33
End: 2018-08-13

## 2018-08-24 DIAGNOSIS — I10 BENIGN ESSENTIAL HYPERTENSION: ICD-10-CM

## 2018-08-24 NOTE — TELEPHONE ENCOUNTER
"Requested Prescriptions   Pending Prescriptions Disp Refills     hydrochlorothiazide (HYDRODIURIL) 50 MG tablet  Last Written Prescription Date:  7/27/18  Last Fill Quantity: 30 tablet,  # refills: 0   Last office visit: 7/27/2018 with prescribing provider:  Carla Mullins NP   Future Office Visit:   30 tablet 0     Sig: Take 1 tablet (50 mg) by mouth daily    Diuretics (Including Combos) Protocol Failed    8/24/2018 11:14 AM       Failed - Blood pressure under 140/90 in past 12 months    BP Readings from Last 3 Encounters:   07/27/18 (!) 154/110   06/01/18 (!) 150/110   03/22/18 (!) 169/100                Passed - Recent (12 mo) or future (30 days) visit within the authorizing provider's specialty    Patient had office visit in the last 12 months or has a visit in the next 30 days with authorizing provider or within the authorizing provider's specialty.  See \"Patient Info\" tab in inbasket, or \"Choose Columns\" in Meds & Orders section of the refill encounter.           Passed - Patient is age 18 or older       Passed - Normal serum creatinine on file in past 12 months    Recent Labs   Lab Test  07/27/18   1004   CR  0.90             Passed - Normal serum potassium on file in past 12 months    Recent Labs   Lab Test  07/27/18   1004   POTASSIUM  4.1                   Passed - Normal serum sodium on file in past 12 months    Recent Labs   Lab Test  07/27/18   1004   NA  140                "

## 2018-08-27 NOTE — TELEPHONE ENCOUNTER
Patient, no showed for 8/20 apt for med recheck.    Please assist with scheduling, then route to provider.    Minoo Blount RN

## 2018-08-28 RX ORDER — HYDROCHLOROTHIAZIDE 50 MG/1
50 TABLET ORAL DAILY
Qty: 30 TABLET | Refills: 0 | Status: SHIPPED | OUTPATIENT
Start: 2018-08-28 | End: 2018-09-04

## 2018-09-04 ENCOUNTER — OFFICE VISIT (OUTPATIENT)
Dept: FAMILY MEDICINE | Facility: CLINIC | Age: 33
End: 2018-09-04
Payer: COMMERCIAL

## 2018-09-04 VITALS
HEIGHT: 70 IN | OXYGEN SATURATION: 99 % | RESPIRATION RATE: 18 BRPM | HEART RATE: 86 BPM | BODY MASS INDEX: 36.22 KG/M2 | WEIGHT: 253 LBS | DIASTOLIC BLOOD PRESSURE: 110 MMHG | TEMPERATURE: 98.4 F | SYSTOLIC BLOOD PRESSURE: 150 MMHG

## 2018-09-04 DIAGNOSIS — I10 BENIGN ESSENTIAL HYPERTENSION: Primary | ICD-10-CM

## 2018-09-04 LAB
ANION GAP SERPL CALCULATED.3IONS-SCNC: 4 MMOL/L (ref 3–14)
BUN SERPL-MCNC: 11 MG/DL (ref 7–30)
CALCIUM SERPL-MCNC: 9.6 MG/DL (ref 8.5–10.1)
CHLORIDE SERPL-SCNC: 99 MMOL/L (ref 94–109)
CO2 SERPL-SCNC: 33 MMOL/L (ref 20–32)
CREAT SERPL-MCNC: 1 MG/DL (ref 0.66–1.25)
GFR SERPL CREATININE-BSD FRML MDRD: 87 ML/MIN/1.7M2
GLUCOSE SERPL-MCNC: 96 MG/DL (ref 70–99)
POTASSIUM SERPL-SCNC: 3.4 MMOL/L (ref 3.4–5.3)
SODIUM SERPL-SCNC: 136 MMOL/L (ref 133–144)

## 2018-09-04 PROCEDURE — 80048 BASIC METABOLIC PNL TOTAL CA: CPT | Performed by: NURSE PRACTITIONER

## 2018-09-04 PROCEDURE — 36415 COLL VENOUS BLD VENIPUNCTURE: CPT | Performed by: NURSE PRACTITIONER

## 2018-09-04 PROCEDURE — 99213 OFFICE O/P EST LOW 20 MIN: CPT | Performed by: NURSE PRACTITIONER

## 2018-09-04 RX ORDER — LISINOPRIL 10 MG/1
10 TABLET ORAL DAILY
Qty: 30 TABLET | Refills: 0 | Status: SHIPPED | OUTPATIENT
Start: 2018-09-04 | End: 2018-09-10

## 2018-09-04 ASSESSMENT — PAIN SCALES - GENERAL: PAINLEVEL: NO PAIN (0)

## 2018-09-04 NOTE — MR AVS SNAPSHOT
After Visit Summary   9/4/2018    Patrice Nicole    MRN: [id number]           Patient Information     Date Of Birth          1985        Visit Information        Provider Department      9/4/2018 11:20 AM Carla Mullins NP [facility]        Today's Diagnoses     Benign essential hypertension    -  1      Care Instructions    Nurse BP check Friday  I will contact you on either increasing medication and/or returning for another BP check the following week  Monitor for allergic reactions: SOB, swelling, rash. Cough is common with this medication but not all people get it.          Follow-ups after your visit        Who to contact     If you have questions or need follow up information about today's clinic visit or your schedule please contact [facility] directly at [phone].  Normal or non-critical lab and imaging results will be communicated to you by [employer], letter or phone within 4 business days after the clinic has received the results. If you do not hear from us within 7 days, please contact the clinic through [employer] or phone. If you have a critical or abnormal lab result, we will notify you by phone as soon as possible.  Submit refill requests through [employer] or call your pharmacy and they will forward the refill request to us. Please allow 3 business days for your refill to be completed.          Additional Information About Your Visit        MyChart Information     [employer] gives you secure access to your electronic health record. If you see a primary care provider, you can also send messages to your care team and make appointments. If you have questions, please call your primary care clinic.  If you do not have a primary care provider, please call [phone] and they will assist you.        Care EveryWhere ID     This is your Care EveryWhere ID. This could be used by other organizations to access your [place] medical records  [id number]        Your  "Vitals Were     Pulse Temperature Respirations Height Pulse Oximetry BMI (Body Mass Index)    86 98.4  F (36.9  C) (Oral) 18 1.765 m (5' 9.5\") 99% 36.83 kg/m2       Blood Pressure from Last 3 Encounters:   09/04/18 (!) 150/110   07/27/18 (!) 154/110   06/01/18 (!) 150/110    Weight from Last 3 Encounters:   09/04/18 114.8 kg (253 lb)   07/27/18 115.8 kg (255 lb 6.4 oz)   06/01/18 115 kg (253 lb 8 oz)              Today, you had the following     No orders found for display         Today's Medication Changes          These changes are accurate as of 9/4/18 11:54 AM.  If you have any questions, ask your nurse or doctor.               Start taking these medicines.        Dose/Directions    lisinopril 10 MG tablet   Commonly known as:  PRINIVIL/ZESTRIL   Used for:  Benign essential hypertension   Started by:  Carla Mullins NP        Dose:  10 mg   Take 1 tablet (10 mg) by mouth daily   Quantity:  30 tablet   Refills:  0         Stop taking these medicines if you haven't already. Please contact your care team if you have questions.     hydrochlorothiazide 50 MG tablet   Commonly known as:  HYDRODIURIL   Stopped by:  Carla Mullins NP                Where to get your medicines      These medications [place] [address] [place] [id number] [place] [address] [place] [address] [place] [facility] [address]     Phone:  [phone]     lisinopril 10 MG tablet                Primary Care Provider Office Phone # Fax #    [facility] [phone]       [address]  [facility] [address]        Equal Access to Services     [facility] LEE AH: Elisha Barajas, jolene dave, rachel kaalmada chau, jose landry. So [facility] [phone].    ATENCIÓN: Si habla español, tiene a teresa disposición servicios gratuitos de asistencia lingüística. Llame al [phone].    We comply with applicable federal civil rights laws and [place] laws. We do not " discriminate on the basis of race, color, national origin, age, disability, sex, sexual orientation, or gender identity.            Thank you!     Thank you for choosing [facility]  for your care. Our goal is always to provide you with excellent care. Hearing back from our patients is one way we can continue to improve our services. Please take a few minutes to complete the written survey that you may receive in the mail after your visit with us. Thank you!             Your Updated Medication List - Protect others around you: Learn how to safely use, store and throw away your medicines at www.disposemymeds.org.          This list is accurate as of 9/4/18 11:54 AM.  Always use your most recent med list.                   Brand Name Dispense Instructions for use Diagnosis    lisinopril 10 MG tablet    PRINIVIL/ZESTRIL    30 tablet    Take 1 tablet (10 mg) by mouth daily    Benign essential hypertension

## 2018-09-04 NOTE — PATIENT INSTRUCTIONS
Nurse BP check Friday  I will contact you on either increasing medication and/or returning for another BP check the following week  Monitor for allergic reactions: SOB, swelling, rash. Cough is common with this medication but not all people get it.

## 2018-09-04 NOTE — PROGRESS NOTES
SUBJECTIVE:   Patrice Nicole is a 32 year old male who presents to clinic today for the following health issues:      Hypertension Follow-up      Outpatient blood pressures are being checked at home.  Results are 140/.    Low Salt Diet: no added salt      Amount of exercise or physical activity: 3-4 days/week for an average of 30-45 minutes    Problems taking medications regularly: No    Medication side effects: none    Diet: regular (no restrictions)  -alcohol: few as documenteddrinks once a week  -no smoke    Gets headaches at times. No family hx. taking blood pressure medication as is.  Is exercising, watching diet.  We talked about how his current medication is not working.  Will adjust.    Problem list and histories reviewed & adjusted, as indicated.  Additional history: as documented    Patient Active Problem List   Diagnosis     Benign neoplasm of scalp     Benign essential hypertension     Acute bilateral low back pain with sciatica, sciatica laterality unspecified     Past Surgical History:   Procedure Laterality Date     ARTHROSCOPIC RECONSTRUCTION ANTERIOR CRUCIATE LIGAMENT  2011 forgein 2014    glass removed from right lower leg       Social History   Substance Use Topics     Smoking status: Never Smoker     Smokeless tobacco: Never Used     Alcohol use Yes      Comment: wine on Friday     Family History   Problem Relation Age of Onset     Hypertension Mother      Hypertension Father      Diabetes Maternal Grandmother      Hypertension Maternal Grandmother      Diabetes Paternal Grandfather          Current Outpatient Prescriptions   Medication Sig Dispense Refill     lisinopril (PRINIVIL/ZESTRIL) 10 MG tablet Take 1 tablet (10 mg) by mouth daily 30 tablet 0     Allergies   Allergen Reactions     Penicillins Rash     Other reaction(s): Confusion       Reviewed and updated as needed this visit by clinical staff  Tobacco  Allergies  Meds  Problems  Med Hx  Surg Hx  Fam Hx  Soc Hx       "  Reviewed and updated as needed this visit by Provider  Allergies  Meds  Problems  Fam Hx         ROS:  Constitutional, HEENT, cardiovascular, pulmonary, gi and gu systems are negative, except as otherwise noted.    OBJECTIVE:     BP (!) 150/110 (BP Location: Right arm, Patient Position: Sitting, Cuff Size: Adult Large)  Pulse 86  Temp 98.4  F (36.9  C) (Oral)  Resp 18  Ht 1.765 m (5' 9.5\")  Wt 114.8 kg (253 lb)  SpO2 99%  BMI 36.83 kg/m2  Body mass index is 36.83 kg/(m^2).  GENERAL: healthy, alert and no distress  RESP: lungs clear to auscultation - no rales, rhonchi or wheezes  CV: regular rate and rhythm, normal S1 S2, no S3 or S4, no murmur, click or rub  MS: no gross musculoskeletal defects noted, no edema    Diagnostic Test Results:  No results found for this or any previous visit (from the past 24 hour(s)).    ASSESSMENT/PLAN:         1. Benign essential hypertension  Stop hydrochlorothiazide, it is not working.  Start lisinopril.  Get nurse blood pressure check on Friday.  Nurse practitioner will be in touch with you if we need to adjust medications at that time, and likely another blood pressure follow-up next week.  Let us check kidney function today call if concerns, otherwise follow-up in 1 month   lisinopril (PRINIVIL/ZESTRIL) 10 MG tablet; Take 1 tablet (10 mg) by mouth daily  Dispense: 30 tablet; Refill: 0  - Basic metabolic panel    FUTURE APPOINTMENTS:       - Follow-up visit in 3 days with nurse blood pressure check, then follow-up 1 month unless otherwise specified on Friday    JORDON Mendiola, NP-C  [facility]    "

## 2018-09-10 ENCOUNTER — OFFICE VISIT (OUTPATIENT)
Dept: FAMILY MEDICINE | Facility: CLINIC | Age: 33
End: 2018-09-10
Payer: COMMERCIAL

## 2018-09-10 VITALS
DIASTOLIC BLOOD PRESSURE: 120 MMHG | OXYGEN SATURATION: 97 % | HEIGHT: 70 IN | HEART RATE: 77 BPM | TEMPERATURE: 98.8 F | RESPIRATION RATE: 20 BRPM | SYSTOLIC BLOOD PRESSURE: 162 MMHG

## 2018-09-10 DIAGNOSIS — I10 BENIGN ESSENTIAL HYPERTENSION: Primary | ICD-10-CM

## 2018-09-10 PROCEDURE — 99213 OFFICE O/P EST LOW 20 MIN: CPT | Performed by: NURSE PRACTITIONER

## 2018-09-10 RX ORDER — METOPROLOL TARTRATE 25 MG/1
25 TABLET, FILM COATED ORAL 2 TIMES DAILY
Qty: 30 TABLET | Refills: 0 | Status: SHIPPED | OUTPATIENT
Start: 2018-09-10

## 2018-09-10 RX ORDER — LISINOPRIL 10 MG/1
20 TABLET ORAL DAILY
Qty: 30 TABLET | Refills: 0 | COMMUNITY
Start: 2018-09-10 | End: 2018-10-08

## 2018-09-10 ASSESSMENT — PAIN SCALES - GENERAL: PAINLEVEL: NO PAIN (0)

## 2018-09-10 NOTE — PATIENT INSTRUCTIONS
Increase lisinopril to 20 mg daily  Start metoprolol 25 mg twice a day, take 2 doses today  Return Thursday for follow up  Go to ER if any sudden headaches, SOB/chest pain, one-sided weakness, blurry vision

## 2018-09-10 NOTE — PROGRESS NOTES
SUBJECTIVE:   Patrice Nicole is a 32 year old male who presents to clinic today for the following health issues:      Hypertension Follow-up      Outpatient blood pressures are not being checked.    Low Salt Diet: no added salt      Amount of exercise or physical activity: 4 days/week for an average of 30-45 minutes    Problems taking medications regularly: No    Medication side effects: none    Diet: regular (no restrictions)    Denies headaches or dizziness, no shortness of breath or chest pain.  Is exercising, is eating healthy.  Does not smoke, rare alcohol use.  Has strong family history of high blood pressure and high cholesterol.  His blood pressure is even higher from last time.  Increased medication and add metoprolol.  Return in 3 days for reevaluation by a provider.  May need to consider renal ultrasound and/or echo if unable to control blood pressure.      Problem list and histories reviewed & adjusted, as indicated.  Additional history: as documented    Patient Active Problem List   Diagnosis     Benign neoplasm of scalp     Benign essential hypertension     Acute bilateral low back pain with sciatica, sciatica laterality unspecified     Past Surgical History:   Procedure Laterality Date     ARTHROSCOPIC RECONSTRUCTION ANTERIOR CRUCIATE LIGAMENT  2011     forgein 2014    glass removed from right lower leg       Social History   Substance Use Topics     Smoking status: Never Smoker     Smokeless tobacco: Never Used     Alcohol use Yes      Comment: wine on Friday     Family History   Problem Relation Age of Onset     Hypertension Mother      Hypertension Father      Diabetes Maternal Grandmother      Hypertension Maternal Grandmother      Diabetes Paternal Grandfather          Current Outpatient Prescriptions   Medication Sig Dispense Refill     lisinopril (PRINIVIL/ZESTRIL) 10 MG tablet Take 2 tablets (20 mg) by mouth daily 30 tablet 0     metoprolol tartrate (LOPRESSOR) 25 MG tablet Take 1 tablet (25  "mg) by mouth 2 times daily 30 tablet 0     [DISCONTINUED] lisinopril (PRINIVIL/ZESTRIL) 10 MG tablet Take 1 tablet (10 mg) by mouth daily 30 tablet 0     Allergies   Allergen Reactions     Penicillins Rash     Other reaction(s): Confusion       Reviewed and updated as needed this visit by clinical staff  Tobacco  Allergies  Meds  Problems  Med Hx  Surg Hx  Fam Hx  Soc Hx        Reviewed and updated as needed this visit by Provider  Allergies  Meds  Problems         ROS:  CV/Resp    OBJECTIVE:     BP (!) 162/120 (BP Location: Right arm, Patient Position: Sitting, Cuff Size: Adult Large)  Pulse 77  Temp 98.8  F (37.1  C) (Oral)  Resp 20  Ht 1.765 m (5' 9.5\")  SpO2 97%  There is no height or weight on file to calculate BMI.  GENERAL: healthy, alert and no distress  RESP: lungs clear to auscultation - no rales, rhonchi or wheezes  CV: regular rate and rhythm, normal S1 S2, no S3 or S4, no murmur, click or rub    Diagnostic Test Results:  none     ASSESSMENT/PLAN:         1. Benign essential hypertension  Uncontrolled.  Increase lisinopril to 20 mg daily, and metoprolol.  Return in 3 days  - metoprolol tartrate (LOPRESSOR) 25 MG tablet; Take 1 tablet (25 mg) by mouth 2 times daily  Dispense: 30 tablet; Refill: 0  - lisinopril (PRINIVIL/ZESTRIL) 10 MG tablet; Take 2 tablets (20 mg) by mouth daily  Dispense: 30 tablet; Refill: 0    FUTURE APPOINTMENTS:       - Follow-up visit in 3 days    JORDON Mendiola, NP-C  [facility]"

## 2018-09-10 NOTE — MR AVS SNAPSHOT
After Visit Summary   9/10/2018    Patrice Nicole    MRN: [id number]           Patient Information     Date Of Birth          1985        Visit Information        Provider Department      9/10/2018 10:40 AM Carla Mullins NP [facility]        Today's Diagnoses     Benign essential hypertension    -  1      Care Instructions    Increase lisinopril to 20 mg daily  Start metoprolol 25 mg twice a day, take 2 doses today  Return Thursday for follow up  Go to ER if any sudden headaches, SOB/chest pain, one-sided weakness, blurry vision          Follow-ups after your visit        Who to contact     If you have questions or need follow up information about today's clinic visit or your schedule please contact [facility] directly at [phone].  Normal or non-critical lab and imaging results will be communicated to you by [employer], letter or phone within 4 business days after the clinic has received the results. If you do not hear from us within 7 days, please contact the clinic through [employer] or phone. If you have a critical or abnormal lab result, we will notify you by phone as soon as possible.  Submit refill requests through [employer] or call your pharmacy and they will forward the refill request to us. Please allow 3 business days for your refill to be completed.          Additional Information About Your Visit        [employer] Information     [employer] gives you secure access to your electronic health record. If you see a primary care provider, you can also send messages to your care team and make appointments. If you have questions, please call your primary care clinic.  If you do not have a primary care provider, please call [phone] and they will assist you.        Care EveryWhere ID     This is your Care EveryWhere ID. This could be used by other organizations to access your [place] medical records  [id number]        Your Vitals Were     Pulse Temperature  "Respirations Height Pulse Oximetry       77 98.8  F (37.1  C) (Oral) 20 1.765 m (5' 9.5\") 97%        Blood Pressure from Last 3 Encounters:   09/10/18 (!) 162/120   09/04/18 (!) 150/110   07/27/18 (!) 154/110    Weight from Last 3 Encounters:   09/04/18 114.8 kg (253 lb)   07/27/18 115.8 kg (255 lb 6.4 oz)   06/01/18 115 kg (253 lb 8 oz)              Today, you had the following     No orders found for display         Today's Medication Changes          These changes are accurate as of 9/10/18 11:16 AM.  If you have any questions, ask your nurse or doctor.               Start taking these medicines.        Dose/Directions    metoprolol tartrate 25 MG tablet   Commonly known as:  LOPRESSOR   Used for:  Benign essential hypertension   Started by:  Carla Mullins NP        Dose:  25 mg   Take 1 tablet (25 mg) by mouth 2 times daily   Quantity:  30 tablet   Refills:  0         These medicines have changed or have updated prescriptions.        Dose/Directions    lisinopril 10 MG tablet   Commonly known as:  PRINIVIL/ZESTRIL   This may have changed:  how much to take   Used for:  Benign essential hypertension   Changed by:  Carla Mullins NP        Dose:  20 mg   Take 2 tablets (20 mg) by mouth daily   Quantity:  30 tablet   Refills:  0            Where to get your medicines      These medications were sent [address] [place] [address] [place] [address]  [facility] [address]     Phone:  [phone]     metoprolol tartrate 25 MG tablet                Primary Care Provider Office Phone # Fax #    [facility] [phone]       [address]  [facility] [place] [address]        Equal Access to Services     [facility] LEE AH: Elisha Barajas, waedwardda luqadaha, qaybta kaalmada chau, jose landry. So [facility] [phone].    ATENCIÓN: Si habla español, tiene a teresa disposición servicios gratuitos de asistencia lingüística. Llame al " [phone].    We comply with applicable federal civil rights laws and [place] laws. We do not discriminate on the basis of race, color, national origin, age, disability, sex, sexual orientation, or gender identity.            Thank you!     Thank you for choosing [facility]  for your care. Our goal is always to provide you with excellent care. Hearing back from our patients is one way we can continue to improve our services. Please take a few minutes to complete the written survey that you may receive in the mail after your visit with us. Thank you!             Your Updated Medication List - Protect others around you: Learn how to safely use, store and throw away your medicines at www.disposemymeds.org.          This list is accurate as of 9/10/18 11:16 AM.  Always use your most recent med list.                   Brand Name Dispense Instructions for use Diagnosis    lisinopril 10 MG tablet    PRINIVIL/ZESTRIL    30 tablet    Take 2 tablets (20 mg) by mouth daily    Benign essential hypertension       metoprolol tartrate 25 MG tablet    LOPRESSOR    30 tablet    Take 1 tablet (25 mg) by mouth 2 times daily    Benign essential hypertension

## 2018-09-26 DIAGNOSIS — I10 BENIGN ESSENTIAL HYPERTENSION: ICD-10-CM

## 2018-09-26 NOTE — TELEPHONE ENCOUNTER
"Requested Prescriptions   Pending Prescriptions Disp Refills     hydrochlorothiazide (HYDRODIURIL) 50 MG tablet 30 tablet 0     Sig: Take 1 tablet (50 mg) by mouth daily    Diuretics (Including Combos) Protocol Failed    9/26/2018 12:04 PM       Failed - Blood pressure under 140/90 in past 12 months    BP Readings from Last 3 Encounters:   09/10/18 (!) 162/120   09/04/18 (!) 150/110   07/27/18 (!) 154/110                Failed - Recent (12 mo) or future (30 days) visit within the authorizing provider's specialty    Patient had office visit in the last 12 months or has a visit in the next 30 days with authorizing provider or within the authorizing provider's specialty.  See \"Patient Info\" tab in inbasket, or \"Choose Columns\" in Meds & Orders section of the refill encounter.           Passed - Patient is age 18 or older       Passed - Normal serum creatinine on file in past 12 months    Recent Labs   Lab Test  09/04/18   1157   CR  1.00             Passed - Normal serum potassium on file in past 12 months    Recent Labs   Lab Test  09/04/18   1157   POTASSIUM  3.4                   Passed - Normal serum sodium on file in past 12 months    Recent Labs   Lab Test  09/04/18   1157   NA  136              hydrochlorothiazide (HYDRODIURIL) 50 MG tablet (Discontinued)      Last Written Prescription Date:  8/28/18  Last Fill Quantity: 30,   # refills: 0  Last Office Visit: 9/10/18  Future Office visit:       Routing refill request to provider for review/approval because:  Drug not active on patient's medication list    "

## 2018-09-27 RX ORDER — HYDROCHLOROTHIAZIDE 50 MG/1
50 TABLET ORAL DAILY
Qty: 30 TABLET | Refills: 0 | OUTPATIENT
Start: 2018-09-27

## 2018-09-27 NOTE — TELEPHONE ENCOUNTER
Request denied to pharmacy. Medication was discontinued on 9/4/18 by provider, medication was not working to control BP. Changed to lisinopril. See Chart Review for details.    Lore Francisco RN  [facility]

## 2018-10-08 DIAGNOSIS — I10 BENIGN ESSENTIAL HYPERTENSION: ICD-10-CM

## 2018-10-10 NOTE — TELEPHONE ENCOUNTER
Routing refill request to provider for review/approval because:  Labs not current:  EFE JOSUE RN

## 2018-10-11 RX ORDER — LISINOPRIL 10 MG/1
20 TABLET ORAL DAILY
Qty: 10 TABLET | Refills: 0 | Status: SHIPPED | OUTPATIENT
Start: 2018-10-11

## 2018-10-11 NOTE — TELEPHONE ENCOUNTER
Patient needs to be seen by a provider for follow up-so limited refill given. His blood pressure is very uncontrolled, he is high risk of heart attack or stroke due to such high blood pressure.   JORDON Mendiola, NP-C  [facility]

## 2018-10-22 DIAGNOSIS — I10 BENIGN ESSENTIAL HYPERTENSION: ICD-10-CM

## 2018-10-22 NOTE — LETTER
October 30, 2018      Patrice Nicole  [address]  [place] [address]      Mr. Nicole,      You are due for an office visit.  On September 10, 2018 you were in the clinic and your blood pressure was very high: 162/110.  This puts you at risk for heart attack or stroke.  It was requested that you returned to see provider within 3 days of that visit and you have not done that yet.  You need to follow-up with any provider in the clinic to talk about your blood pressure medication and to potentially schedule further testing to see why your blood pressure is not improving.  You also need to make an appointment prior to further medication refills.        Sincerely,        JORDON Mendiola, NP-C  [facility]

## 2018-10-22 NOTE — TELEPHONE ENCOUNTER
"Requested Prescriptions   Pending Prescriptions Disp Refills     metoprolol tartrate (LOPRESSOR) 25 MG tablet 30 tablet 0     Sig: Take 1 tablet (25 mg) by mouth 2 times daily    Beta-Blockers Protocol Failed    10/22/2018  9:37 AM       Failed - Blood pressure under 140/90 in past 12 months    BP Readings from Last 3 Encounters:   09/10/18 (!) 162/120   09/04/18 (!) 150/110   07/27/18 (!) 154/110                Failed - Recent (12 mo) or future (30 days) visit within the authorizing provider's specialty    Patient had office visit in the last 12 months or has a visit in the next 30 days with authorizing provider or within the authorizing provider's specialty.  See \"Patient Info\" tab in inbasket, or \"Choose Columns\" in Meds & Orders section of the refill encounter.             Passed - Patient is age 6 or older        metoprolol tartrate (LOPRESSOR) 25 MG tablet  Last Written Prescription Date:  9/10/18  Last Fill Quantity: 30,  # refills: 0   Last office visit: 9/10/2018 with prescribing provider:  Carla Mullins   Future Office Visit:      "

## 2018-10-24 RX ORDER — METOPROLOL TARTRATE 25 MG/1
25 TABLET, FILM COATED ORAL 2 TIMES DAILY
Qty: 30 TABLET | Refills: 0 | OUTPATIENT
Start: 2018-10-24

## 2018-10-24 NOTE — TELEPHONE ENCOUNTER
Routing refill request to provider for review/approval because:  Kristyn given x1 and patient did not follow up, please advise  Patient was to have returned to clinic by 9/14/18. No future appointment is scheduled.    Kimberly Flores RN, [facility] Triage

## 2018-10-24 NOTE — TELEPHONE ENCOUNTER
BP was VERY high. Needs to be seen in clinic-recommend this week. Can give very limited refill when appointment is made to make it to clinic visit.   JORDON Mendiola, NP-C  [facility]

## 2018-10-24 NOTE — TELEPHONE ENCOUNTER
This writer attempted to contact pt on 10/24/18      Reason for call schedule OV and left message.      If patient calls back:   Schedule Office Visit appointment within 1 week with primary care, document that pt called and route to provider.        Adelina Avila MA

## 2018-10-26 NOTE — TELEPHONE ENCOUNTER
This writer attempted to contact pt on 10/26/18      Reason for call schedule appt and left message.      If patient calls back:   Schedule Office Visit appointment within 1 week with primary care, document that pt called and route to provider      Adelina Avila MA

## 2018-10-29 NOTE — TELEPHONE ENCOUNTER
LM for pt to call clinic.  3rd attempt, with no response.  Please advise.      Adelina BROWN, Patient Care

## 2018-10-31 DIAGNOSIS — I10 BENIGN ESSENTIAL HYPERTENSION: ICD-10-CM

## 2018-10-31 NOTE — TELEPHONE ENCOUNTER
"Requested Prescriptions   Pending Prescriptions Disp Refills     hydrochlorothiazide (HYDRODIURIL) 50 MG tablet 30 tablet 0     Sig: Take 1 tablet (50 mg) by mouth daily    Diuretics (Including Combos) Protocol Failed    10/31/2018  9:33 AM       Failed - Blood pressure under 140/90 in past 12 months    BP Readings from Last 3 Encounters:   09/10/18 (!) 162/120   09/04/18 (!) 150/110   07/27/18 (!) 154/110                Failed - Recent (12 mo) or future (30 days) visit within the authorizing provider's specialty    Patient had office visit in the last 12 months or has a visit in the next 30 days with authorizing provider or within the authorizing provider's specialty.  See \"Patient Info\" tab in inbasket, or \"Choose Columns\" in Meds & Orders section of the refill encounter.             Passed - Patient is age 18 or older       Passed - Normal serum creatinine on file in past 12 months    Recent Labs   Lab Test  09/04/18   1157   CR  1.00             Passed - Normal serum potassium on file in past 12 months    Recent Labs   Lab Test  09/04/18   1157   POTASSIUM  3.4                   Passed - Normal serum sodium on file in past 12 months    Recent Labs   Lab Test  09/04/18   1157   NA  136                hydrochlorothiazide (HYDRODIURIL) 50 MG tablet (Discontinued)      Last Written Prescription Date:  8/28/18  Last Fill Quantity: 30,   # refills: 0  Last Office Visit: 9/10/18  Future Office visit:       Routing refill request to provider for review/approval because:  Drug not active on patient's medication list    "

## 2018-11-02 RX ORDER — HYDROCHLOROTHIAZIDE 50 MG/1
50 TABLET ORAL DAILY
Qty: 30 TABLET | Refills: 0 | OUTPATIENT
Start: 2018-11-02

## 2018-11-02 NOTE — TELEPHONE ENCOUNTER
Needs in person appointment before any refills will be given.  JORDON Mendiola, NP-C  [facility]

## 2018-11-02 NOTE — TELEPHONE ENCOUNTER
Pt has been called multiple times and letter was sent to him last week.    Adelina BROWN, Patient Care

## 2019-05-20 ENCOUNTER — TELEPHONE (OUTPATIENT)
Dept: FAMILY MEDICINE | Facility: CLINIC | Age: 34
End: 2019-05-20

## 2019-05-20 NOTE — TELEPHONE ENCOUNTER
Panel Management Review   One phone call and send letter if unable to reach them or MyChart message and send letter if not read after 2 weeks (You will get a message to your inbasket)      BP Readings from Last 1 Encounters:   09/10/18 (!) 162/120        Health Maintenance Due   Topic Date Due     HIV SCREENING  10/06/2002     PREVENTIVE CARE VISIT  06/09/2018     PHQ-2  01/01/2019            Patient is due/failing the following:   BP CHECK    Action needed:   Patient needs office visit for hypertension with provider.    Type of outreach:    Phone, left message for patient to call back. Mychart and letter sent.     LXIONG3, MEDICAL ASSISTANT

## 2019-05-20 NOTE — LETTER
May 20, 2019      Patrice Nicole  [address]  [place] [address]        Dear Patrice,       Your provider has reviewed your chart and noticed we have not seen you in a while. With your hypertension, it is important that we keep a close eye on your blood pressure at least every 6 months, more if out of range. Please give us a call at [phone] to schedule your hypertension/blood pressure recheck appointment.      Thanks,   [facility] Team

## 2020-03-10 ENCOUNTER — HEALTH MAINTENANCE LETTER (OUTPATIENT)
Age: 35
End: 2020-03-10

## 2020-12-27 ENCOUNTER — HEALTH MAINTENANCE LETTER (OUTPATIENT)
Age: 35
End: 2020-12-27

## 2021-04-24 ENCOUNTER — HEALTH MAINTENANCE LETTER (OUTPATIENT)
Age: 36
End: 2021-04-24

## 2021-10-09 ENCOUNTER — HEALTH MAINTENANCE LETTER (OUTPATIENT)
Age: 36
End: 2021-10-09

## 2022-05-21 ENCOUNTER — HEALTH MAINTENANCE LETTER (OUTPATIENT)
Age: 37
End: 2022-05-21

## 2022-09-17 ENCOUNTER — HEALTH MAINTENANCE LETTER (OUTPATIENT)
Age: 37
End: 2022-09-17

## 2023-06-03 ENCOUNTER — HEALTH MAINTENANCE LETTER (OUTPATIENT)
Age: 38
End: 2023-06-03